# Patient Record
Sex: FEMALE | Race: WHITE | Employment: UNEMPLOYED | URBAN - NONMETROPOLITAN AREA
[De-identification: names, ages, dates, MRNs, and addresses within clinical notes are randomized per-mention and may not be internally consistent; named-entity substitution may affect disease eponyms.]

---

## 2017-03-22 LAB
CHOLESTEROL, TOTAL: 128 MG/DL
CHOLESTEROL/HDL RATIO: 2.1
HDLC SERPL-MCNC: 61 MG/DL (ref 35–70)
LDL CHOLESTEROL CALCULATED: 48.2 MG/DL (ref 0–160)
TRIGL SERPL-MCNC: 94 MG/DL
VLDLC SERPL CALC-MCNC: 19 MG/DL

## 2017-09-12 ENCOUNTER — OFFICE VISIT (OUTPATIENT)
Dept: FAMILY MEDICINE CLINIC | Age: 82
End: 2017-09-12
Payer: MEDICARE

## 2017-09-12 VITALS
TEMPERATURE: 98.3 F | DIASTOLIC BLOOD PRESSURE: 60 MMHG | HEART RATE: 80 BPM | WEIGHT: 156 LBS | SYSTOLIC BLOOD PRESSURE: 110 MMHG

## 2017-09-12 DIAGNOSIS — R05.9 COUGH: ICD-10-CM

## 2017-09-12 DIAGNOSIS — H66.002 ACUTE SUPPURATIVE OTITIS MEDIA OF LEFT EAR WITHOUT SPONTANEOUS RUPTURE OF TYMPANIC MEMBRANE, RECURRENCE NOT SPECIFIED: Primary | ICD-10-CM

## 2017-09-12 PROCEDURE — 1123F ACP DISCUSS/DSCN MKR DOCD: CPT | Performed by: FAMILY MEDICINE

## 2017-09-12 PROCEDURE — 99213 OFFICE O/P EST LOW 20 MIN: CPT | Performed by: FAMILY MEDICINE

## 2017-09-12 PROCEDURE — 4040F PNEUMOC VAC/ADMIN/RCVD: CPT | Performed by: FAMILY MEDICINE

## 2017-09-12 PROCEDURE — 1036F TOBACCO NON-USER: CPT | Performed by: FAMILY MEDICINE

## 2017-09-12 PROCEDURE — G8421 BMI NOT CALCULATED: HCPCS | Performed by: FAMILY MEDICINE

## 2017-09-12 PROCEDURE — 1090F PRES/ABSN URINE INCON ASSESS: CPT | Performed by: FAMILY MEDICINE

## 2017-09-12 PROCEDURE — G8427 DOCREV CUR MEDS BY ELIG CLIN: HCPCS | Performed by: FAMILY MEDICINE

## 2017-09-12 RX ORDER — FUROSEMIDE 40 MG/1
1 TABLET ORAL DAILY
COMMUNITY
Start: 2017-07-10 | End: 2017-10-02 | Stop reason: SDUPTHER

## 2017-09-12 RX ORDER — AZITHROMYCIN 250 MG/1
TABLET, FILM COATED ORAL
Qty: 1 PACKET | Refills: 0 | Status: SHIPPED | OUTPATIENT
Start: 2017-09-12 | End: 2017-09-22

## 2017-09-12 RX ORDER — CALCITRIOL 0.25 UG/1
1 CAPSULE, LIQUID FILLED ORAL
COMMUNITY
End: 2021-06-03

## 2017-09-12 RX ORDER — NITROGLYCERIN 0.4 MG/1
TABLET SUBLINGUAL
COMMUNITY

## 2017-09-12 RX ORDER — ATORVASTATIN CALCIUM 40 MG/1
1 TABLET, FILM COATED ORAL DAILY
COMMUNITY
Start: 2017-07-10 | End: 2017-10-25 | Stop reason: SDUPTHER

## 2017-09-12 RX ORDER — METOPROLOL SUCCINATE 50 MG/1
50 TABLET, EXTENDED RELEASE ORAL DAILY
COMMUNITY
Start: 2017-05-23 | End: 2017-10-25 | Stop reason: SDUPTHER

## 2017-09-12 RX ORDER — ISOSORBIDE MONONITRATE 30 MG/1
1 TABLET, EXTENDED RELEASE ORAL DAILY
COMMUNITY
Start: 2017-07-10 | End: 2019-10-07 | Stop reason: DRUGHIGH

## 2017-09-12 ASSESSMENT — ENCOUNTER SYMPTOMS
COUGH: 1
SHORTNESS OF BREATH: 0
WHEEZING: 0

## 2017-09-26 LAB
ALBUMIN: 3.6 G/DL (ref 3.5–5)
APPEARANCE: CLEAR
BASOPHILS ABSOLUTE: 0 10'3/UL (ref 0.1–0.2)
BASOPHILS RELATIVE PERCENT: 1.1 % (ref 0–1.7)
BILIRUBIN, URINE: NEGATIVE
BUN BLDV-MCNC: 21 MG/DL (ref 7–18)
CALCIUM SERPL-MCNC: 8.6 MG/DL (ref 8.5–10.1)
CHLORIDE BLD-SCNC: 106 MMOL/L (ref 97–107)
CO2: 27 MMOL/L (ref 21–32)
COLOR, URINE: YELLOW
CREAT SERPL-MCNC: 1.33 MG/DL (ref 0.55–1.02)
CREATININE URINE: 157 MG/DL
EOSINOPHILS ABSOLUTE: 0.1 10'3/UL (ref 0–0.4)
EOSINOPHILS RELATIVE PERCENT: 1.8 % (ref 0–6.4)
GFR CALCULATED: 40
GLUCOSE URINE: NEGATIVE G/DL
GLUCOSE: 123 MG/DL (ref 70–99)
HCT VFR BLD CALC: 41.9 % (ref 34.6–44.1)
HEMOGLOBIN: 14.1 G/DL (ref 11.7–14.9)
KETONES, URINE: NEGATIVE MG/DL
LEUKOCYTE ESTERASE, URINE: NEGATIVE
LYMPHOCYTES ABSOLUTE: 1 10'3/UL (ref 0.5–3.5)
LYMPHOCYTES RELATIVE PERCENT: 21.6 % (ref 17.4–45.9)
MAGNESIUM: 1.8 MG/DL (ref 1.8–2.4)
MCH RBC QN AUTO: 31.1 PG (ref 27.8–33.2)
MCHC RBC AUTO-ENTMCNC: 33.7 G/DL (ref 32.7–34.8)
MCV RBC AUTO: 92.1 FL (ref 83–97.4)
MICROALBUMIN UR-MCNC: 16.4 MG/L
MICROALBUMIN/CREAT UR-RTO: 11.4 MG/G
MONOCYTES ABSOLUTE: 0.4 10'3/UL (ref 0.2–0.8)
MONOCYTES RELATIVE PERCENT: 8.4 % (ref 4.4–12)
NEUTROPHILS ABSOLUTE: 3.1 10'3/UL (ref 1.5–5.6)
NEUTROPHILS SEGMENTED: 67.1 % (ref 41.2–72.1)
NITRITE, URINE: NEGATIVE
OCCULT BLOOD,URINE: NEGATIVE
PDW BLD-RTO: 12.9 % (ref 12.2–15.8)
PH, URINE: 6
PHOSPHORUS: 2.6 MG/DL (ref 2.5–4.9)
PLATELET # BLD: 227 10'3/UL (ref 122–359)
PMV BLD AUTO: 7.4 FL (ref 7.6–10.6)
POTASSIUM SERPL-SCNC: 3.9 MMOL/L (ref 3.5–5.1)
PROTEIN UA: NEGATIVE MG/DL
PTH INTACT (WITHOUT CALCIUM): 161 PG/ML (ref 14–64)
RBC # BLD: 4.55 10'6/UL (ref 3.85–4.88)
SODIUM BLD-SCNC: 140 MMOL/L (ref 136–145)
SPECIFIC GRAVITY, URINE: 1.02 (ref 1–1.03)
SQUAMOUS EPITHELIAL: NORMAL /LPF
UROBILINOGEN, URINE: 0.2 EU/DL
VITAMIN D 25-HYDROXY: 36 NG/ML (ref 30–100)
VITAMIN D2, 25 HYDROXY: <4 NG/ML
VITAMIN D3,25 HYDROXY: 36 NG/ML
WBC: 4.6 10'3/UL (ref 3.2–9.3)

## 2017-09-29 VITALS
SYSTOLIC BLOOD PRESSURE: 118 MMHG | WEIGHT: 164 LBS | BODY MASS INDEX: 30.18 KG/M2 | HEART RATE: 78 BPM | HEIGHT: 62 IN | DIASTOLIC BLOOD PRESSURE: 62 MMHG

## 2017-09-29 DIAGNOSIS — I25.10 ATHEROSCLEROSIS OF NATIVE CORONARY ARTERY OF NATIVE HEART WITHOUT ANGINA PECTORIS: ICD-10-CM

## 2017-09-29 DIAGNOSIS — N18.9 CHRONIC KIDNEY DISEASE, UNSPECIFIED: ICD-10-CM

## 2017-09-29 DIAGNOSIS — E21.3 HYPERPARATHYROIDISM, UNSPECIFIED (HCC): ICD-10-CM

## 2017-09-29 DIAGNOSIS — M81.0 AGE-RELATED OSTEOPOROSIS WITHOUT CURRENT PATHOLOGICAL FRACTURE: ICD-10-CM

## 2017-09-29 DIAGNOSIS — I47.20 VENTRICULAR TACHYCARDIA: ICD-10-CM

## 2017-09-29 DIAGNOSIS — I10 UNSPECIFIED ESSENTIAL HYPERTENSION: ICD-10-CM

## 2017-09-29 PROBLEM — C64.9 MALIGNANT NEOPLASM OF KIDNEY (HCC): Status: ACTIVE | Noted: 2017-09-29

## 2017-09-29 PROBLEM — E78.5 HYPERLIPIDEMIA: Status: ACTIVE | Noted: 2017-09-29

## 2017-09-29 RX ORDER — OMEPRAZOLE 20 MG/1
20 CAPSULE, DELAYED RELEASE ORAL DAILY
COMMUNITY
End: 2018-07-30 | Stop reason: CLARIF

## 2017-10-02 ENCOUNTER — OFFICE VISIT (OUTPATIENT)
Dept: FAMILY MEDICINE CLINIC | Age: 82
End: 2017-10-02
Payer: MEDICARE

## 2017-10-02 VITALS
WEIGHT: 155 LBS | SYSTOLIC BLOOD PRESSURE: 110 MMHG | DIASTOLIC BLOOD PRESSURE: 60 MMHG | HEART RATE: 68 BPM | BODY MASS INDEX: 27.46 KG/M2 | HEIGHT: 63 IN

## 2017-10-02 DIAGNOSIS — M81.0 AGE-RELATED OSTEOPOROSIS WITHOUT CURRENT PATHOLOGICAL FRACTURE: ICD-10-CM

## 2017-10-02 DIAGNOSIS — I25.10 ATHEROSCLEROSIS OF NATIVE CORONARY ARTERY OF NATIVE HEART WITHOUT ANGINA PECTORIS: Primary | ICD-10-CM

## 2017-10-02 DIAGNOSIS — C64.2 MALIGNANT NEOPLASM OF KIDNEY, LEFT (HCC): ICD-10-CM

## 2017-10-02 DIAGNOSIS — Z23 NEED FOR PROPHYLACTIC VACCINATION AND INOCULATION AGAINST INFLUENZA: ICD-10-CM

## 2017-10-02 DIAGNOSIS — N18.9 CHRONIC KIDNEY DISEASE, UNSPECIFIED: ICD-10-CM

## 2017-10-02 DIAGNOSIS — E21.3 HYPERPARATHYROIDISM, UNSPECIFIED (HCC): ICD-10-CM

## 2017-10-02 DIAGNOSIS — E78.2 MIXED HYPERLIPIDEMIA: ICD-10-CM

## 2017-10-02 PROCEDURE — G8419 CALC BMI OUT NRM PARAM NOF/U: HCPCS | Performed by: FAMILY MEDICINE

## 2017-10-02 PROCEDURE — G8427 DOCREV CUR MEDS BY ELIG CLIN: HCPCS | Performed by: FAMILY MEDICINE

## 2017-10-02 PROCEDURE — 99213 OFFICE O/P EST LOW 20 MIN: CPT | Performed by: FAMILY MEDICINE

## 2017-10-02 PROCEDURE — 1090F PRES/ABSN URINE INCON ASSESS: CPT | Performed by: FAMILY MEDICINE

## 2017-10-02 PROCEDURE — 4005F PHARM THX FOR OP RXD: CPT | Performed by: FAMILY MEDICINE

## 2017-10-02 PROCEDURE — G0008 ADMIN INFLUENZA VIRUS VAC: HCPCS | Performed by: FAMILY MEDICINE

## 2017-10-02 PROCEDURE — G8598 ASA/ANTIPLAT THER USED: HCPCS | Performed by: FAMILY MEDICINE

## 2017-10-02 PROCEDURE — 4040F PNEUMOC VAC/ADMIN/RCVD: CPT | Performed by: FAMILY MEDICINE

## 2017-10-02 PROCEDURE — 1036F TOBACCO NON-USER: CPT | Performed by: FAMILY MEDICINE

## 2017-10-02 PROCEDURE — 1123F ACP DISCUSS/DSCN MKR DOCD: CPT | Performed by: FAMILY MEDICINE

## 2017-10-02 PROCEDURE — G8484 FLU IMMUNIZE NO ADMIN: HCPCS | Performed by: FAMILY MEDICINE

## 2017-10-02 PROCEDURE — 90662 IIV NO PRSV INCREASED AG IM: CPT | Performed by: FAMILY MEDICINE

## 2017-10-02 RX ORDER — FUROSEMIDE 40 MG/1
40 TABLET ORAL DAILY
Qty: 90 TABLET | Refills: 3 | Status: SHIPPED | OUTPATIENT
Start: 2017-10-02 | End: 2018-09-04 | Stop reason: SDUPTHER

## 2017-10-02 ASSESSMENT — PATIENT HEALTH QUESTIONNAIRE - PHQ9
SUM OF ALL RESPONSES TO PHQ9 QUESTIONS 1 & 2: 0
1. LITTLE INTEREST OR PLEASURE IN DOING THINGS: 0
2. FEELING DOWN, DEPRESSED OR HOPELESS: 0
SUM OF ALL RESPONSES TO PHQ QUESTIONS 1-9: 0

## 2017-10-02 ASSESSMENT — ENCOUNTER SYMPTOMS
ABDOMINAL PAIN: 0
BLOOD IN STOOL: 0
SHORTNESS OF BREATH: 0
DIARRHEA: 0
WHEEZING: 0
CONSTIPATION: 0
SORE THROAT: 0

## 2017-10-02 NOTE — MR AVS SNAPSHOT
cancers. BMI is not perfect. It may overestimate body fat in athletes and people who are more muscular. If your body fat is high you can improve your BMI by decreasing your calorie intake and becoming more physically active. Learn more at: [employer]             Today's Medication Changes          These changes are accurate as of: 10/2/17 10:19 AM.  If you have any questions, ask your nurse or doctor. CHANGE how you take these medications           denosumab 60 MG/ML Soln SC injection   Commonly known as:  PROLIA   Instructions:  Infuse 1 mL intravenously every 6 months   Quantity:  1.8 mL   Refills:  0   What changed:    - how much to take  - how to take this   Changed by:  Charly Guillermo MD       furosemide 40 MG tablet   Commonly known as:  LASIX   Instructions:   Take 1 tablet by mouth daily   Quantity:  90 tablet   Refills:  3   What changed:  how to take this   Changed by:  Charly Guillermo MD            Where to Get Your Medications      These medications were sent to [address] [place] [address]     Phone:  [phone]     furosemide 40 MG tablet         You can get these medications from any pharmacy     Bring a paper prescription for each of these medications     denosumab 60 MG/ML Soln SC injection               Your Current Medications Are              denosumab (PROLIA) 60 MG/ML SOLN SC injection Infuse 1 mL intravenously every 6 months    furosemide (LASIX) 40 MG tablet Take 1 tablet by mouth daily    omeprazole (PRILOSEC) 20 MG delayed release capsule Take 20 mg by mouth daily    vitamin D (CHOLECALCIFEROL) 1000 UNIT TABS tablet Take 1,000 Units by mouth daily    aspirin 81 MG tablet Take 1 tablet by mouth daily    atorvastatin (LIPITOR) 40 MG tablet Take 1 tablet by mouth daily MyChart Signup           [employer] allows you to send messages to your doctor, view your test results, renew your prescriptions, schedule appointments, view visit notes, and more. How Do I Sign Up? 1. In your Internet browser, go to [email or web address]  2. Click on the Sign Up Now link in the Sign In box. You will see the New Member Sign Up page. 3. Enter your [employer] Access Code exactly as it appears below. You will not need to use this code after youve completed the sign-up process. If you do not sign up before the expiration date, you must request a new code. [employer] Access Code: [email or web address]  Expires: 11/11/2017  9:06 AM    4. Enter your Social Security Number ([id number]) and Date of Birth (mm/dd/yyyy) as indicated and click Submit. You will be taken to the next sign-up page. 5. Create a [employer] ID. This will be your [employer] login ID and cannot be changed, so think of one that is secure and easy to remember. 6. Create a [employer] password. You can change your password at any time. 7. Enter your Password Reset Question and Answer. This can be used at a later time if you forget your password. 8. Enter your e-mail address. You will receive e-mail notification when new information is available in [address] 9. Click Sign Up. You can now view your medical record. Additional Information  If you have questions, please contact the physician practice where you receive care. Remember, [employer] is NOT to be used for urgent needs. For medical emergencies, dial 911. For questions regarding your [employer] account call [phone]. If you have a clinical question, please call your doctor's office.

## 2017-10-02 NOTE — PROGRESS NOTES
rhonchi. She has no rales. She exhibits no tenderness. Abdominal: Soft. There is no hepatosplenomegaly. There is no tenderness. Musculoskeletal: She exhibits no edema. Lymphadenopathy:     She has no cervical adenopathy. She has no axillary adenopathy. Neurological: No cranial nerve deficit. Vitals reviewed. Assessment:     1. Atherosclerosis of native coronary artery of native heart without angina pectoris     2. Need for prophylactic vaccination and inoculation against influenza  INFLUENZA, HIGH DOSE, 65 YRS +, IM, PF, PREFILL SYR, 0.5ML (FLUZONE HD)   3. Age-related osteoporosis without current pathological fracture  denosumab (PROLIA) 60 MG/ML SOLN SC injection   4. Chronic kidney disease, unspecified  furosemide (LASIX) 40 MG tablet   5. Mixed hyperlipidemia     6. Hyperparathyroidism, unspecified ([facility] [address]     7. Malignant neoplasm of kidney, left [facility])      s/p nephrectomy 1996             Plan:     Continue current meds including prolia   Orders Given:  Orders Placed This Encounter   Procedures    INFLUENZA, HIGH DOSE, 65 YRS +, IM, PF, PREFILL SYR, 0.5ML (FLUZONE HD)     Prescriptions:    Orders Placed This Encounter   Medications    denosumab (PROLIA) 60 MG/ML SOLN SC injection     Sig: Infuse 1 mL intravenously every 6 months     Dispense:  1.8 mL     Refill:  0    furosemide (LASIX) 40 MG tablet     Sig: Take 1 tablet by mouth daily     Dispense:  90 tablet     Refill:  3        Return in about 6 months (around 4/2/2018). Electronically signed by Zach Sanchez MD on 10/2/2017.

## 2017-10-21 ENCOUNTER — TELEPHONE (OUTPATIENT)
Dept: FAMILY MEDICINE CLINIC | Age: 82
End: 2017-10-21

## 2017-10-21 DIAGNOSIS — M81.0 AGE RELATED OSTEOPOROSIS, UNSPECIFIED PATHOLOGICAL FRACTURE PRESENCE: Primary | ICD-10-CM

## 2017-10-25 DIAGNOSIS — E78.2 MIXED HYPERLIPIDEMIA: Primary | ICD-10-CM

## 2017-10-25 DIAGNOSIS — I10 ESSENTIAL HYPERTENSION: ICD-10-CM

## 2017-10-25 LAB — CALCIUM SERPL-MCNC: 9.6 MG/DL

## 2017-10-26 RX ORDER — ATORVASTATIN CALCIUM 40 MG/1
40 TABLET, FILM COATED ORAL DAILY
Qty: 90 TABLET | Refills: 3 | Status: SHIPPED | OUTPATIENT
Start: 2017-10-26 | End: 2018-09-04 | Stop reason: SDUPTHER

## 2017-10-26 RX ORDER — METOPROLOL SUCCINATE 50 MG/1
50 TABLET, EXTENDED RELEASE ORAL DAILY
Qty: 90 TABLET | Refills: 3 | Status: SHIPPED | OUTPATIENT
Start: 2017-10-26 | End: 2019-04-03

## 2018-03-28 LAB
ALBUMIN: 3.5 G/DL (ref 3.5–5)
APPEARANCE: CLEAR
BASOPHILS ABSOLUTE: 0 10'3/UL (ref 0.1–0.2)
BASOPHILS RELATIVE PERCENT: 0.3 % (ref 0–1.7)
BILIRUBIN, URINE: NEGATIVE
BUN BLDV-MCNC: 20 MG/DL (ref 7–18)
CALCIUM SERPL-MCNC: 8.6 MG/DL (ref 8.5–10.1)
CHLORIDE BLD-SCNC: 110 MMOL/L (ref 97–107)
CO2: 26 MMOL/L (ref 21–32)
COLOR, URINE: YELLOW
CREAT SERPL-MCNC: 1.24 MG/DL (ref 0.55–1.02)
CREATININE URINE: 176 MG/DL
EOSINOPHILS ABSOLUTE: 0.1 10'3/UL (ref 0–0.4)
EOSINOPHILS RELATIVE PERCENT: 2 % (ref 0–6.4)
GFR CALCULATED: 43
GLUCOSE URINE: NEGATIVE G/DL
GLUCOSE: 88 MG/DL (ref 70–99)
HCT VFR BLD CALC: 42.7 % (ref 34.6–44.1)
HEMOGLOBIN: 14.3 G/DL (ref 11.7–14.9)
KETONES, URINE: ABNORMAL MG/DL
LEUKOCYTE ESTERASE, URINE: NEGATIVE
LYMPHOCYTES ABSOLUTE: 0.8 10'3/UL (ref 0.5–3.5)
LYMPHOCYTES RELATIVE PERCENT: 15.2 % (ref 17.4–45.9)
MAGNESIUM: 1.8 MG/DL (ref 1.8–2.4)
MCH RBC QN AUTO: 31.6 PG (ref 27.8–33.2)
MCHC RBC AUTO-ENTMCNC: 33.5 G/DL (ref 32.7–34.8)
MCV RBC AUTO: 94.5 FL (ref 83–97.4)
MICROALBUMIN UR-MCNC: 45.3 MG/L
MICROALBUMIN/CREAT UR-RTO: 28.3 MG/G
MONOCYTES ABSOLUTE: 0.5 10'3/UL (ref 0.2–0.8)
MONOCYTES RELATIVE PERCENT: 9.2 % (ref 4.4–12)
MUCUS, URINE: ABNORMAL /LPF
NEUTROPHILS ABSOLUTE: 3.7 10'3/UL (ref 1.5–5.6)
NEUTROPHILS SEGMENTED: 73.3 % (ref 41.2–72.1)
NITRITE, URINE: NEGATIVE
OCCULT BLOOD,URINE: NEGATIVE
PDW BLD-RTO: 13.5 % (ref 12.2–15.8)
PH, URINE: 6
PHOSPHORUS: 3.2 MG/DL (ref 2.5–4.9)
PLATELET # BLD: 182 10'3/UL (ref 122–359)
PMV BLD AUTO: 7.3 FL (ref 7.6–10.6)
POTASSIUM SERPL-SCNC: 3.7 MMOL/L (ref 3.5–5.1)
PROTEIN UA: NEGATIVE MG/DL
PTH INTACT (WITHOUT CALCIUM): 63 PG/ML (ref 14–64)
RBC # BLD: 4.52 10'6/UL (ref 3.85–4.88)
RBC URINE: ABNORMAL /HPF (ref 0–5)
SODIUM BLD-SCNC: 144 MMOL/L (ref 136–145)
SPECIFIC GRAVITY, URINE: 1.02 (ref 1–1.03)
SQUAMOUS EPITHELIAL: ABNORMAL /LPF
URINE SEDIMENT COMMENTS: ABNORMAL /HPF
UROBILINOGEN, URINE: 0.2 EU/DL
VITAMIN D 25-HYDROXY: 40 NG/ML (ref 30–100)
VITAMIN D2, 25 HYDROXY: <4 NG/ML
VITAMIN D3,25 HYDROXY: 40 NG/ML
WBC URINE: ABNORMAL /HPF (ref 0–4)
WBC: 5.1 10'3/UL (ref 3.2–9.3)

## 2018-03-30 LAB
CREAT SERPL-MCNC: 1.35 MG/DL (ref 0.55–1.02)
CREATININE URINE: 63.4 MG/DL

## 2018-04-02 ENCOUNTER — OFFICE VISIT (OUTPATIENT)
Dept: FAMILY MEDICINE CLINIC | Age: 83
End: 2018-04-02
Payer: MEDICARE

## 2018-04-02 VITALS
BODY MASS INDEX: 27.01 KG/M2 | HEART RATE: 60 BPM | WEIGHT: 152 LBS | SYSTOLIC BLOOD PRESSURE: 128 MMHG | DIASTOLIC BLOOD PRESSURE: 68 MMHG

## 2018-04-02 DIAGNOSIS — C64.2 MALIGNANT NEOPLASM OF LEFT KIDNEY (HCC): ICD-10-CM

## 2018-04-02 DIAGNOSIS — E78.2 MIXED HYPERLIPIDEMIA: ICD-10-CM

## 2018-04-02 DIAGNOSIS — N18.9 CHRONIC KIDNEY DISEASE, UNSPECIFIED CKD STAGE: ICD-10-CM

## 2018-04-02 DIAGNOSIS — M81.0 AGE-RELATED OSTEOPOROSIS WITHOUT CURRENT PATHOLOGICAL FRACTURE: ICD-10-CM

## 2018-04-02 DIAGNOSIS — Z23 NEED FOR 23-POLYVALENT PNEUMOCOCCAL POLYSACCHARIDE VACCINE: ICD-10-CM

## 2018-04-02 DIAGNOSIS — E21.3 HYPERPARATHYROIDISM (HCC): ICD-10-CM

## 2018-04-02 DIAGNOSIS — I10 ESSENTIAL HYPERTENSION: Primary | ICD-10-CM

## 2018-04-02 DIAGNOSIS — I25.10 ATHEROSCLEROSIS OF NATIVE CORONARY ARTERY OF NATIVE HEART WITHOUT ANGINA PECTORIS: ICD-10-CM

## 2018-04-02 PROBLEM — I47.20 VENTRICULAR TACHYCARDIA: Status: RESOLVED | Noted: 2017-09-29 | Resolved: 2018-04-02

## 2018-04-02 PROBLEM — H66.002 ACUTE SUPPURATIVE OTITIS MEDIA OF LEFT EAR WITHOUT SPONTANEOUS RUPTURE OF TYMPANIC MEMBRANE: Status: RESOLVED | Noted: 2017-09-12 | Resolved: 2018-04-02

## 2018-04-02 LAB
ALT SERPL-CCNC: 36 UNITS/L
AST SERPL-CCNC: 23 UNITS/L
CHOLESTEROL/HDL RATIO: 1.9 RATIO
CHOLESTEROL: 119 MG/DL
HDL, DIRECT: 62 MG/DL
LDL CHOLESTEROL CALCULATED: 37.8 MG/DL
TRIGL SERPL-MCNC: 96 MG/DL
VLDLC SERPL CALC-MCNC: 19 MG/DL

## 2018-04-02 PROCEDURE — 1036F TOBACCO NON-USER: CPT | Performed by: FAMILY MEDICINE

## 2018-04-02 PROCEDURE — 1123F ACP DISCUSS/DSCN MKR DOCD: CPT | Performed by: FAMILY MEDICINE

## 2018-04-02 PROCEDURE — 4040F PNEUMOC VAC/ADMIN/RCVD: CPT | Performed by: FAMILY MEDICINE

## 2018-04-02 PROCEDURE — G8419 CALC BMI OUT NRM PARAM NOF/U: HCPCS | Performed by: FAMILY MEDICINE

## 2018-04-02 PROCEDURE — G8427 DOCREV CUR MEDS BY ELIG CLIN: HCPCS | Performed by: FAMILY MEDICINE

## 2018-04-02 PROCEDURE — G8598 ASA/ANTIPLAT THER USED: HCPCS | Performed by: FAMILY MEDICINE

## 2018-04-02 PROCEDURE — 99214 OFFICE O/P EST MOD 30 MIN: CPT | Performed by: FAMILY MEDICINE

## 2018-04-02 PROCEDURE — 1090F PRES/ABSN URINE INCON ASSESS: CPT | Performed by: FAMILY MEDICINE

## 2018-04-02 ASSESSMENT — ENCOUNTER SYMPTOMS
WHEEZING: 0
ABDOMINAL PAIN: 0
CONSTIPATION: 0
DIARRHEA: 0
BLOOD IN STOOL: 0
SHORTNESS OF BREATH: 0
SORE THROAT: 0

## 2018-07-30 ENCOUNTER — OFFICE VISIT (OUTPATIENT)
Dept: FAMILY MEDICINE CLINIC | Age: 83
End: 2018-07-30
Payer: MEDICARE

## 2018-07-30 VITALS
BODY MASS INDEX: 25.77 KG/M2 | SYSTOLIC BLOOD PRESSURE: 130 MMHG | WEIGHT: 145 LBS | HEART RATE: 68 BPM | DIASTOLIC BLOOD PRESSURE: 80 MMHG

## 2018-07-30 DIAGNOSIS — M25.551 ACUTE RIGHT HIP PAIN: Primary | ICD-10-CM

## 2018-07-30 PROCEDURE — 1101F PT FALLS ASSESS-DOCD LE1/YR: CPT | Performed by: FAMILY MEDICINE

## 2018-07-30 PROCEDURE — 4040F PNEUMOC VAC/ADMIN/RCVD: CPT | Performed by: FAMILY MEDICINE

## 2018-07-30 PROCEDURE — G8419 CALC BMI OUT NRM PARAM NOF/U: HCPCS | Performed by: FAMILY MEDICINE

## 2018-07-30 PROCEDURE — 99213 OFFICE O/P EST LOW 20 MIN: CPT | Performed by: FAMILY MEDICINE

## 2018-07-30 PROCEDURE — 1036F TOBACCO NON-USER: CPT | Performed by: FAMILY MEDICINE

## 2018-07-30 PROCEDURE — G8427 DOCREV CUR MEDS BY ELIG CLIN: HCPCS | Performed by: FAMILY MEDICINE

## 2018-07-30 PROCEDURE — G8598 ASA/ANTIPLAT THER USED: HCPCS | Performed by: FAMILY MEDICINE

## 2018-07-30 PROCEDURE — 1090F PRES/ABSN URINE INCON ASSESS: CPT | Performed by: FAMILY MEDICINE

## 2018-07-30 PROCEDURE — 1123F ACP DISCUSS/DSCN MKR DOCD: CPT | Performed by: FAMILY MEDICINE

## 2018-07-30 RX ORDER — METHYLPREDNISOLONE 4 MG/1
TABLET ORAL
Qty: 1 KIT | Refills: 0 | Status: SHIPPED | OUTPATIENT
Start: 2018-07-30 | End: 2018-08-05

## 2018-07-30 ASSESSMENT — ENCOUNTER SYMPTOMS
ABDOMINAL PAIN: 0
BACK PAIN: 0

## 2018-07-30 NOTE — PROGRESS NOTES
[address] [place] [address] E. [address]  Dept: [phone]  Dept Fax: [phone]    Marielena Fontaine is a 80 y.o. female who presents today for her medical conditions/complaints as noted below. Marielena Fontaine is c/o of Hip Pain (pt c/o right hip pain started last week states it hurts to put any weight on right side has been getting worse, hurts to sit,stand or walk pain is most relieved when laying flat has been taking Tylenol which helps relieve some of the pain)      HPI:     HPI   Patient comes in with right hip pain. This started last Wednesday. gradual onset on Wednesday. It was no trauma. No fall. No rash. Pain is localized around the right hip joint with radiation into the groin. It hurts if she puts weight on it. So it hurts if she walks. It actually hurts if she sits. She is comfortable she lays flat. This is about the worst pain she's ever had. Does have a history of osteoporosis without pathologic fracture. She remains on prolia   Last bone density testing April 2017. T score -2.9, left femoral neckShe knows    She denies problems with her bowel or bladder. No blood per rectum. No burning with urination.         BP Readings from Last 3 Encounters:   07/30/18 130/80   04/02/18 128/68   10/02/17 110/60            (goal 120/80)    Past Medical History:   Diagnosis Date    Acute renal failure (HCC)     Adrenal mass (HCC)     stable    Aseptic necrosis (HCC)     right hip    CAD (coronary artery disease)     COPD (chronic obstructive pulmonary disease) (HCC)     good bronchodilator response  and mild reduction in DLCO    Left ventricular dysfunction     Osteoporosis     Renal cell carcinoma ([facility] [address] 1996    Small bowel obstruction 2006    ischemic bowel      Past Surgical History:   Procedure Laterality Date    AV FISTULA REPAIR  2007    enterocutaneous    CHOLECYSTECTOMY  2007    CORON[facility]Y  02/2015    [facility] pain    Codeine      Other reaction(s): Intolerance-unknown  Other reaction(s): Intolerance-unknown  Other reaction(s): Intolerance-unknown    Fentanyl      Other reaction(s): Intolerance-unknown  Other reaction(s): Intolerance-unknown  Other reaction(s): Intolerance-unknown    Hydrocodone-Acetaminophen      Other reaction(s): Intolerance-unknown  Other reaction(s): Intolerance-unknown  Other reaction(s): Intolerance-unknown    Iodides      Other reaction(s): Intolerance-unknown  Other reaction(s): Intolerance-unknown  Other reaction(s): Intolerance-unknown    Lisinopril      Other reaction(s): Intolerance-unknown  Other reaction(s): Intolerance-unknown  Other reaction(s): Intolerance-unknown  Other reaction(s): Intolerance-unknown  Other reaction(s): Intolerance-unknown  Other reaction(s): Intolerance-unknown       Health Maintenance   Topic Date Due    Shingles Vaccine (1 of 2 - 2 Dose Series) 10/07/1976    DTaP/Tdap/Td vaccine (1 - Tdap) 09/05/1998    Flu vaccine (1) 09/01/2018    Potassium monitoring  03/28/2019    Creatinine monitoring  03/30/2019    Pneumococcal low/med risk  Completed       Subjective:      Review of Systems   Constitutional: Negative for appetite change, chills and fever. Cardiovascular: Negative for leg swelling. Gastrointestinal: Negative for abdominal pain. Musculoskeletal: Positive for arthralgias and gait problem. Negative for back pain. Hematological: Negative for adenopathy. Objective:     /80   Pulse 68   Wt 145 lb (65.8 kg)   BMI 25.77 kg/m²     Physical Exam   Constitutional: She appears well-nourished. Neck: Neck supple. Cardiovascular: Normal rate, regular rhythm, S1 normal and S2 normal.    No murmur heard. Pulmonary/Chest: Breath sounds normal. She has no wheezes. She has no rhonchi. She has no rales. She exhibits no tenderness. Abdominal: Soft. There is no hepatosplenomegaly. There is no tenderness.  There is no rebound, no guarding and

## 2018-08-02 ENCOUNTER — OFFICE VISIT (OUTPATIENT)
Dept: FAMILY MEDICINE CLINIC | Age: 83
End: 2018-08-02
Payer: MEDICARE

## 2018-08-02 VITALS
HEART RATE: 64 BPM | DIASTOLIC BLOOD PRESSURE: 74 MMHG | BODY MASS INDEX: 25.95 KG/M2 | SYSTOLIC BLOOD PRESSURE: 110 MMHG | WEIGHT: 146 LBS

## 2018-08-02 DIAGNOSIS — M25.551 ACUTE RIGHT HIP PAIN: Primary | ICD-10-CM

## 2018-08-02 PROCEDURE — 1123F ACP DISCUSS/DSCN MKR DOCD: CPT | Performed by: FAMILY MEDICINE

## 2018-08-02 PROCEDURE — 4040F PNEUMOC VAC/ADMIN/RCVD: CPT | Performed by: FAMILY MEDICINE

## 2018-08-02 PROCEDURE — G8419 CALC BMI OUT NRM PARAM NOF/U: HCPCS | Performed by: FAMILY MEDICINE

## 2018-08-02 PROCEDURE — G8598 ASA/ANTIPLAT THER USED: HCPCS | Performed by: FAMILY MEDICINE

## 2018-08-02 PROCEDURE — 1036F TOBACCO NON-USER: CPT | Performed by: FAMILY MEDICINE

## 2018-08-02 PROCEDURE — 1101F PT FALLS ASSESS-DOCD LE1/YR: CPT | Performed by: FAMILY MEDICINE

## 2018-08-02 PROCEDURE — G8427 DOCREV CUR MEDS BY ELIG CLIN: HCPCS | Performed by: FAMILY MEDICINE

## 2018-08-02 PROCEDURE — 1090F PRES/ABSN URINE INCON ASSESS: CPT | Performed by: FAMILY MEDICINE

## 2018-08-02 PROCEDURE — 99213 OFFICE O/P EST LOW 20 MIN: CPT | Performed by: FAMILY MEDICINE

## 2018-08-02 RX ORDER — MULTIVIT-MIN/IRON/FOLIC ACID/K 18-600-40
1 CAPSULE ORAL DAILY
COMMUNITY

## 2018-08-02 ASSESSMENT — ENCOUNTER SYMPTOMS
ABDOMINAL PAIN: 0
BACK PAIN: 0

## 2018-08-02 NOTE — PROGRESS NOTES
[address] [address] [address]  Dept: [phone]  Dept Fax: [phone]    Mae Murphy is a 80 y.o. female who presents today for her medical conditions/complaints as noted below. Mae Murphy is c/o of Follow-up (pt here to f/u on hip pain and to review imaging. pt reports she )      HPI:     HPI     Patient follows up with her hip and back pain. Fortunately she feels much better. He says this and she started a Medrol Dosepak next day she felt better. Her x-rays are basically unremarkable. Some arthritic changes of the SI joints. Right hip arthroplasty was stable. No evidence of fracture or loosening.         BP Readings from Last 3 Encounters:   08/02/18 110/74   07/30/18 130/80   04/02/18 128/68            (goal 120/80)    Past Medical History:   Diagnosis Date    Acute renal failure (HCC)     Adrenal mass (HCC)     stable    Aseptic necrosis (HCC)     right hip    CAD (coronary artery disease)     COPD (chronic obstructive pulmonary disease) (HCC)     good bronchodilator response  and mild reduction in DLCO    Left ventricular dysfunction     Osteoporosis     Renal cell carcinoma ([address] 1996    Small bowel obstruction 2006    ischemic bowel      Past Surgical History:   Procedure Laterality Date    AV FISTULA REPAIR  2007    enterocutaneous    CHOLECYSTECTOMY  2007    [facility]2015    [facility]    CORONARY ANGIOPLASTY WITH STENT PLACEMENT  1998    RCA twice in [address] (once for IWMI; repeat for re stenosis)   Adrian Rangel DILATION  [id number]    FEMUR FRACTURE SURGERY Right 2002   [place] Goljarred HERNIA REPAIR  2007    ventral hernia-Dr Sandoval    HERNIA REPAIR  05/2009    complex ventral Dr Afsahn Zamora Right 2006    SMALL INTESTINE SURGERY  2006    ischemic bowel    TOTAL NEPHRECTOMY  1996    UPPER GASTROINTESTINAL ENDOSCOPY  06/2007    Dr Nika Kumari     Family History Problem Relation Age of Onset    Breast Cancer Mother     Diabetes Mother     Coronary Art Dis Father      Social History   Substance Use Topics    Smoking status: Former Smoker    Smokeless tobacco: Never Used    Alcohol use No        Current Outpatient Prescriptions   Medication Sig Dispense Refill    Cholecalciferol (VITAMIN D) 2000 units CAPS capsule Take 1 capsule by mouth daily      methylPREDNISolone (MEDROL DOSEPACK) 4 MG tablet Take by mouth. 1 kit 0    denosumab (PROLIA) 60 MG/ML SOLN SC injection Infuse 1 mL intravenously every 6 months 1.8 mL 0    atorvastatin (LIPITOR) 40 MG tablet Take 1 tablet by mouth daily 90 tablet 3    metoprolol succinate (TOPROL XL) 50 MG extended release tablet Take 1 tablet by mouth daily 1/2 tablet 90 tablet 3    furosemide (LASIX) 40 MG tablet Take 1 tablet by mouth daily 90 tablet 3    aspirin 81 MG tablet Take 1 tablet by mouth daily      calcitRIOL (ROCALTROL) 0.25 MCG capsule Take 1 capsule by mouth three times a week      isosorbide mononitrate (IMDUR) 30 MG extended release tablet Take 1 tablet by mouth daily      nitroGLYCERIN (NITROSTAT) 0.4 MG SL tablet       Omeprazole Magnesium (PRILOSEC OTC PO) 1 tablet as needed       No current facility-administered medications for this visit. Allergies   Allergen Reactions    Alendronate      Back pain    Codeine      Other reaction(s): Intolerance-unknown  Other reaction(s): Intolerance-unknown  Other reaction(s): Intolerance-unknown    Fentanyl      Other reaction(s): Intolerance-unknown  Other reaction(s): Intolerance-unknown  Other reaction(s): Intolerance-unknown    Hydrocodone-Acetaminophen      Other reaction(s): Intolerance-unknown  Other reaction(s): Intolerance-unknown  Other reaction(s): Intolerance-unknown    Iodides      Other reaction(s): Intolerance-unknown  Other reaction(s): Intolerance-unknown  Other reaction(s):  Intolerance-unknown    Lisinopril      Other reaction(s):

## 2018-09-04 DIAGNOSIS — E78.2 MIXED HYPERLIPIDEMIA: ICD-10-CM

## 2018-09-04 DIAGNOSIS — N18.9 CHRONIC KIDNEY DISEASE, UNSPECIFIED: ICD-10-CM

## 2018-09-04 RX ORDER — ATORVASTATIN CALCIUM 40 MG/1
40 TABLET, FILM COATED ORAL DAILY
Qty: 90 TABLET | Refills: 3 | Status: SHIPPED | OUTPATIENT
Start: 2018-09-04 | End: 2018-10-02 | Stop reason: DRUGHIGH

## 2018-09-04 RX ORDER — FUROSEMIDE 40 MG/1
40 TABLET ORAL DAILY
Qty: 90 TABLET | Refills: 3 | Status: SHIPPED | OUTPATIENT
Start: 2018-09-04 | End: 2018-10-02 | Stop reason: SDUPTHER

## 2018-10-02 ENCOUNTER — OFFICE VISIT (OUTPATIENT)
Dept: FAMILY MEDICINE CLINIC | Age: 83
End: 2018-10-02
Payer: MEDICARE

## 2018-10-02 VITALS
SYSTOLIC BLOOD PRESSURE: 124 MMHG | BODY MASS INDEX: 25.91 KG/M2 | HEART RATE: 70 BPM | OXYGEN SATURATION: 97 % | DIASTOLIC BLOOD PRESSURE: 78 MMHG | WEIGHT: 145.8 LBS

## 2018-10-02 DIAGNOSIS — E21.3 HYPERPARATHYROIDISM (HCC): ICD-10-CM

## 2018-10-02 DIAGNOSIS — I25.10 ATHEROSCLEROSIS OF NATIVE CORONARY ARTERY OF NATIVE HEART WITHOUT ANGINA PECTORIS: Primary | ICD-10-CM

## 2018-10-02 DIAGNOSIS — Z23 NEED FOR PROPHYLACTIC VACCINATION AND INOCULATION AGAINST INFLUENZA: ICD-10-CM

## 2018-10-02 DIAGNOSIS — C64.2 MALIGNANT NEOPLASM OF LEFT KIDNEY (HCC): ICD-10-CM

## 2018-10-02 DIAGNOSIS — E78.2 MIXED HYPERLIPIDEMIA: ICD-10-CM

## 2018-10-02 DIAGNOSIS — N18.9 CHRONIC KIDNEY DISEASE, UNSPECIFIED CKD STAGE: ICD-10-CM

## 2018-10-02 DIAGNOSIS — M81.0 AGE-RELATED OSTEOPOROSIS WITHOUT CURRENT PATHOLOGICAL FRACTURE: ICD-10-CM

## 2018-10-02 DIAGNOSIS — I10 ESSENTIAL HYPERTENSION: ICD-10-CM

## 2018-10-02 LAB — CALCIUM SERPL-MCNC: 9.5 MG/DL

## 2018-10-02 PROCEDURE — G8419 CALC BMI OUT NRM PARAM NOF/U: HCPCS | Performed by: FAMILY MEDICINE

## 2018-10-02 PROCEDURE — 1123F ACP DISCUSS/DSCN MKR DOCD: CPT | Performed by: FAMILY MEDICINE

## 2018-10-02 PROCEDURE — 1036F TOBACCO NON-USER: CPT | Performed by: FAMILY MEDICINE

## 2018-10-02 PROCEDURE — G8482 FLU IMMUNIZE ORDER/ADMIN: HCPCS | Performed by: FAMILY MEDICINE

## 2018-10-02 PROCEDURE — 99214 OFFICE O/P EST MOD 30 MIN: CPT | Performed by: FAMILY MEDICINE

## 2018-10-02 PROCEDURE — 4040F PNEUMOC VAC/ADMIN/RCVD: CPT | Performed by: FAMILY MEDICINE

## 2018-10-02 PROCEDURE — G8427 DOCREV CUR MEDS BY ELIG CLIN: HCPCS | Performed by: FAMILY MEDICINE

## 2018-10-02 PROCEDURE — 1090F PRES/ABSN URINE INCON ASSESS: CPT | Performed by: FAMILY MEDICINE

## 2018-10-02 PROCEDURE — G0008 ADMIN INFLUENZA VIRUS VAC: HCPCS | Performed by: FAMILY MEDICINE

## 2018-10-02 PROCEDURE — 1101F PT FALLS ASSESS-DOCD LE1/YR: CPT | Performed by: FAMILY MEDICINE

## 2018-10-02 PROCEDURE — G8598 ASA/ANTIPLAT THER USED: HCPCS | Performed by: FAMILY MEDICINE

## 2018-10-02 PROCEDURE — 90662 IIV NO PRSV INCREASED AG IM: CPT | Performed by: FAMILY MEDICINE

## 2018-10-02 RX ORDER — FUROSEMIDE 40 MG/1
40 TABLET ORAL DAILY
Qty: 90 TABLET | Refills: 3 | Status: SHIPPED | OUTPATIENT
Start: 2018-10-02 | End: 2019-04-03 | Stop reason: SDUPTHER

## 2018-10-02 RX ORDER — ATORVASTATIN CALCIUM 40 MG/1
40 TABLET, FILM COATED ORAL DAILY
Qty: 90 TABLET | Refills: 3 | Status: SHIPPED
Start: 2018-10-02 | End: 2019-04-03 | Stop reason: DRUGHIGH

## 2018-10-02 RX ORDER — FUROSEMIDE 40 MG/1
40 TABLET ORAL DAILY
Qty: 90 TABLET | Refills: 3 | Status: SHIPPED | OUTPATIENT
Start: 2018-10-02 | End: 2018-10-02 | Stop reason: SDUPTHER

## 2018-10-02 ASSESSMENT — PATIENT HEALTH QUESTIONNAIRE - PHQ9
1. LITTLE INTEREST OR PLEASURE IN DOING THINGS: 0
SUM OF ALL RESPONSES TO PHQ QUESTIONS 1-9: 0
SUM OF ALL RESPONSES TO PHQ9 QUESTIONS 1 & 2: 0
2. FEELING DOWN, DEPRESSED OR HOPELESS: 0
SUM OF ALL RESPONSES TO PHQ QUESTIONS 1-9: 0

## 2018-10-03 NOTE — PROGRESS NOTES
Pneumococcal low/med risk  Completed       Lab Results   Component Value Date    K 3.7 03/28/2018    CREATININE 1.35 03/30/2018    AST 23 04/02/2018    ALT 36 04/02/2018    HCT 42.7 03/28/2018    GLUCOSE 88 03/28/2018    MG 1.8 03/28/2018    CALCIUM 9.5 10/02/2018    VITD25 40 03/28/2018      Lab Results   Component Value Date    CHOL 119 04/02/2018    CHOL 128 03/22/2017    TRIG 96 04/02/2018    HDL 61 03/22/2017       Review of Systems:      Review of Systems   Constitutional: Negative for appetite change, chills and fever. HENT: Negative for sore throat. Respiratory: Negative for shortness of breath and wheezing. Cardiovascular: Negative for chest pain, palpitations and leg swelling. Gastrointestinal: Negative for abdominal pain, blood in stool, constipation and diarrhea. Genitourinary: Negative for dysuria, hematuria and urgency. Musculoskeletal: Negative for arthralgias. Hematological: Negative for adenopathy. Objective:     /78 (Site: Left Upper Arm, Position: Sitting, Cuff Size: Small Adult)   Pulse 70   Wt 145 lb 12.8 oz (66.1 kg)   SpO2 97%   BMI 25.91 kg/m²     Physical Exam   Constitutional: She appears well-developed and well-nourished. Appears years younger than her stated age   HENT:   Head: Normocephalic. Right Ear: Tympanic membrane normal.   Left Ear: Tympanic membrane normal.   Nose: Nose normal.   Mouth/Throat: No oropharyngeal exudate or posterior oropharyngeal erythema. Neck: Neck supple. Carotid bruit is not present. No thyromegaly present. Cardiovascular: Normal rate, regular rhythm, S1 normal and S2 normal.    No murmur heard. Pulmonary/Chest: Breath sounds normal. She has no wheezes. She has no rhonchi. She has no rales. She exhibits no tenderness. Abdominal: Soft. There is no hepatosplenomegaly. There is no tenderness. Musculoskeletal: She exhibits no edema. Right hip: Normal.   Lymphadenopathy:     She has no cervical adenopathy.      She

## 2018-10-04 ASSESSMENT — ENCOUNTER SYMPTOMS
SHORTNESS OF BREATH: 0
CONSTIPATION: 0
DIARRHEA: 0
BLOOD IN STOOL: 0
ABDOMINAL PAIN: 0
WHEEZING: 0
SORE THROAT: 0

## 2019-03-20 LAB
ALT SERPL-CCNC: 24 UNITS/L (ref 9–52)
AST SERPL-CCNC: 20 UNITS/L (ref 14–36)
CHOLESTEROL/HDL RATIO: 2.1 RATIO (ref 0–4.5)
CHOLESTEROL: 108 MG/DL (ref 50–200)
HDL, DIRECT: 52 MG/DL (ref 36–68)
LDL CHOLESTEROL CALCULATED: 44.2 MG/DL (ref 0–160)
TRIGL SERPL-MCNC: 59 MG/DL (ref 10–250)
VLDLC SERPL CALC-MCNC: 12 MG/DL (ref 0–40)

## 2019-04-02 ASSESSMENT — ENCOUNTER SYMPTOMS
BLOOD IN STOOL: 0
CONSTIPATION: 0
WHEEZING: 0
SHORTNESS OF BREATH: 0
DIARRHEA: 0
ABDOMINAL PAIN: 0

## 2019-04-02 NOTE — PROGRESS NOTES
[facility]  [address]  Dept: [phone]  DeptFax: [phone]    Miguel Bosch is a80 y.o. female who presents today for her medical conditions/complaints as noted below. Miguel Bosch is c/o of 6 Month Follow-Up (denies any issues or complaints); Hyperlipidemia; and Hypertension (pt reports her bp med has been adjusted and says she is still running in the lower range )      HPI:     HPI    Routine 6 month check up     Hypertension . She is taking metoprolol but only taking one-half of a 25 mg tablet. Says the dose was reduced by nephrology. He checks her blood pressures occasionally. The systolic is in the low 227E, below 120. She does feel a bit tired but she is not sure if that's due to her age. She denies chest pain or chest pressure or shortness of breath or edema. She does take furosemide 40 mg daily for fluid balance. No orthopnea or paroxysmal nocturnal dyspnea. .  Blood pressure for us is 104/72     Hyperlipidemia  She is now taking one half of a 40 mg tablet of atorvastatin. Her recentt lipid profile showed her to have a cholesterol of 108, HDL 52 and LDL 44. She has no side effects with the medication. No muscle aches or arthralgias other than one might expect at age 80.      Coronary artery disease  She is followed by Dr. Matthew Brown. She last saw him in just a couple of weeks ago. She has a history of a drug-eluting stent to the right coronary artery years ago. In 2015, she did have a stress test which showed a previous anterior infarct but no ischemia. She is on isosorbide mononitrate 30 mg but she has never used a sublingual nitroglycerin.     Chronic kidney disease/status post nephrectomy  She is followed by Dr. Mahin Dietz twice a year and has multiple labs done for him. Due to see Dr Mahin Dietz next week. She is on Calcitrol and vitamin D. She also has a history of a nephrectomy.   A left nephrectomy for renal cancer  She had undergone a left nephrectomy years ago, actually back in 1996. Chris Pabon Remains on low-dose of furosemide     Osteoporosis  She believes that her Prolia infusions are helping her. It apparently helps some of her arthritic complaints.   She is due to get another Prolia injection in about a week     BP Readings from Last 3 Encounters:   04/03/19 104/72   10/02/18 124/78   08/02/18 110/74            (goal 120/80)    Past Medical History:   Diagnosis Date    Acute renal failure (HCC)     Adrenal mass (HCC)     stable    Aseptic necrosis (HCC)     right hip    CAD (coronary artery disease)     COPD (chronic obstructive pulmonary disease) (HCC)     good bronchodilator response  and mild reduction in DLCO    Left ventricular dysfunction     Osteoporosis     Renal cell carcinoma ([address] 1996    Small bowel obstruction ([address] 2006    ischemic bowel      Past Surgical History:   Procedure Laterality Date    AV FISTULA REPAIR  2007    enterocutaneous    CHOLECYSTECTOMY  2007    CORONARY ANGIOPLASTY  02/2015    [facility] normal    CORONARY ANGIOPLASTY WITH STENT PLACEMENT  1998    RCA twice in 1998 (once for IWMI; repeat for re stenosis)   Desma Lek DILATION  [id number]    FEMUR FRACTURE SURGERY Right 2002    HERNIA REPAIR  2007    ventral hernia-Dr Sandoval    HERNIA REPAIR  05/2009    complex ventral Dr Gracia Griffin Right 2006    SMALL INTESTINE SURGERY  2006    ischemic bowel    TOTAL NEPHRECTOMY  1996    UPPER GASTROINTESTINAL ENDOSCOPY  06/2007    Dr Mala Betts     Family History   Problem Relation Age of Onset    Breast Cancer Mother     Diabetes Mother     Coronary Art Dis Father      Social History     Tobacco Use    Smoking status: Former Smoker    Smokeless tobacco: Never Used   Substance Use Topics    Alcohol use: No        Current Outpatient Medications   Medication Sig Dispense Refill    metoprolol succinate (TOPROL XL) 25 MG extended release tablet Take 12.5 mg by mouth      furosemide (LASIX) 40 MG tablet Take 1 tablet by mouth daily 90 tablet 3    atorvastatin (LIPITOR) 20 MG tablet Take 1 tablet by mouth daily 90 tablet 3    denosumab (PROLIA) 60 MG/ML SOLN SC injection Infuse 1 mL intravenously every 6 months 1.8 mL 0    Cholecalciferol (VITAMIN D) 2000 units CAPS capsule Take 1 capsule by mouth daily      aspirin 81 MG tablet Take 1 tablet by mouth daily      calcitRIOL (ROCALTROL) 0.25 MCG capsule Take 1 capsule by mouth three times a week      isosorbide mononitrate (IMDUR) 30 MG extended release tablet Take 1 tablet by mouth daily      nitroGLYCERIN (NITROSTAT) 0.4 MG SL tablet        No current facility-administered medications for this visit. Allergies   Allergen Reactions    Alendronate      Back pain    Codeine      Other reaction(s): Intolerance-unknown  Other reaction(s): Intolerance-unknown  Other reaction(s): Intolerance-unknown    Fentanyl      Other reaction(s): Intolerance-unknown  Other reaction(s): Intolerance-unknown  Other reaction(s): Intolerance-unknown    Hydrocodone-Acetaminophen      Other reaction(s): Intolerance-unknown  Other reaction(s): Intolerance-unknown  Other reaction(s): Intolerance-unknown    Iodides      Other reaction(s): Intolerance-unknown  Other reaction(s): Intolerance-unknown  Other reaction(s): Intolerance-unknown    Lisinopril      Other reaction(s): Intolerance-unknown  Other reaction(s): Intolerance-unknown  Other reaction(s): Intolerance-unknown  Other reaction(s): Intolerance-unknown  Other reaction(s): Intolerance-unknown  Other reaction(s):  Intolerance-unknown       Health Maintenance   Topic Date Due    Shingles Vaccine (1 of 2) 10/07/1976    DTaP/Tdap/Td vaccine (2 - Tdap) 09/04/2008    Potassium monitoring  03/28/2019    Creatinine monitoring  03/30/2019    Flu vaccine  Completed    Pneumococcal 65+ years Vaccine  Completed       Lab Results   Component Value Date    K 3.7 03/28/2018 CREATININE 1.35 03/30/2018    AST 20 03/20/2019    ALT 24 03/20/2019    HCT 42.7 03/28/2018    GLUCOSE 88 03/28/2018    MG 1.8 03/28/2018    CALCIUM 9.5 10/02/2018    VITD25 40 03/28/2018      Lab Results   Component Value Date    CHOL 108 03/20/2019    CHOL 128 03/22/2017    TRIG 59 03/20/2019    HDL 61 03/22/2017       Subjective:      Review of Systems   Constitutional: Positive for fatigue. Negative for appetite change, chills and fever. HENT: Negative. Respiratory: Negative for cough, shortness of breath and wheezing. Cardiovascular: Negative for chest pain, palpitations and leg swelling. Gastrointestinal: Negative for abdominal pain, blood in stool, constipation and diarrhea. Genitourinary: Negative for dysuria and hematuria. Musculoskeletal: Negative for arthralgias and gait problem. Neurological: Negative. Hematological: Negative for adenopathy. Psychiatric/Behavioral: Negative. Objective:     /72   Pulse 74   Ht 5' 2.9\" (1.598 m)   Wt 148 lb (67.1 kg)   SpO2 95%   BMI 26.30 kg/m²     Physical Exam   Constitutional: She appears well-developed and well-nourished. Appears years younger than her stated age   HENT:   Mouth/Throat: Oropharynx is clear and moist and mucous membranes are normal.   Bilateral hearing prostheses   Neck: Neck supple. Carotid bruit is not present. No thyromegaly present. Cardiovascular: Normal rate, regular rhythm, S1 normal and S2 normal.   No murmur heard. Pulmonary/Chest: Breath sounds normal. She has no wheezes. She has no rhonchi. She has no rales. She exhibits no tenderness. Abdominal: Soft. There is no hepatosplenomegaly. There is no tenderness. Musculoskeletal: She exhibits no edema. Right hip: Normal.   Lymphadenopathy:     She has no cervical adenopathy. She has no axillary adenopathy. Neurological: No cranial nerve deficit. Vitals reviewed. Assessment:      Diagnosis Orders   1.  Atherosclerosis of native coronary artery of native heart without angina pectoris  furosemide (LASIX) 40 MG tablet   2. Essential hypertension     3. Age-related osteoporosis without current pathological fracture     4. Hyperparathyroidism ([address]     5. Chronic kidney disease, unspecified CKD stage     6. Mixed hyperlipidemia     7. Malignant neoplasm of left kidney [facility])        Quality & Risk Score Accuracy    Last edited 04/03/19 09:36 EDT by Mora Diaz MD           POC Testing Results (If Applicable):  No results found for this visit on 04/03/19. Plan:     She'll continue current medications. Noting the decreased doses of her atorvastatin and metoprolol. I don't recommend stopping the metoprolol totally. Due to its cardioprotective effects. We'll defer to Dr. Heidi Liz comes to that. Don't believe that her fatigue is related to her blood pressure. She'll follow-up with nephrology. She'll follow-up with me in 6 months. She'll get her Prolia injection next month. Return sooner if any problems    Orders Given:  No orders of the defined types were placed in this encounter. Prescriptions:    Orders Placed This Encounter   Medications    furosemide (LASIX) 40 MG tablet     Sig: Take 1 tablet by mouth daily     Dispense:  90 tablet     Refill:  3    atorvastatin (LIPITOR) 20 MG tablet     Sig: Take 1 tablet by mouth daily     Dispense:  90 tablet     Refill:  3        Return in about 6 months (around 10/3/2019). Electronically signed by Mora Diaz MD on4/3/2019. **This report has been created using voice recognition software. It may contain minor errors which are inherent in voice recognition technology. **

## 2019-04-03 ENCOUNTER — OFFICE VISIT (OUTPATIENT)
Dept: FAMILY MEDICINE CLINIC | Age: 84
End: 2019-04-03
Payer: MEDICARE

## 2019-04-03 VITALS
OXYGEN SATURATION: 95 % | SYSTOLIC BLOOD PRESSURE: 102 MMHG | HEIGHT: 63 IN | WEIGHT: 148 LBS | DIASTOLIC BLOOD PRESSURE: 80 MMHG | HEART RATE: 74 BPM | BODY MASS INDEX: 26.22 KG/M2

## 2019-04-03 DIAGNOSIS — N18.9 CHRONIC KIDNEY DISEASE, UNSPECIFIED CKD STAGE: ICD-10-CM

## 2019-04-03 DIAGNOSIS — I10 ESSENTIAL HYPERTENSION: ICD-10-CM

## 2019-04-03 DIAGNOSIS — C64.2 MALIGNANT NEOPLASM OF LEFT KIDNEY (HCC): ICD-10-CM

## 2019-04-03 DIAGNOSIS — N18.30 CHRONIC KIDNEY DISEASE, STAGE III (MODERATE) (HCC): ICD-10-CM

## 2019-04-03 DIAGNOSIS — E21.3 HYPERPARATHYROIDISM (HCC): ICD-10-CM

## 2019-04-03 DIAGNOSIS — E78.2 MIXED HYPERLIPIDEMIA: ICD-10-CM

## 2019-04-03 DIAGNOSIS — M81.0 AGE-RELATED OSTEOPOROSIS WITHOUT CURRENT PATHOLOGICAL FRACTURE: ICD-10-CM

## 2019-04-03 DIAGNOSIS — I25.10 ATHEROSCLEROSIS OF NATIVE CORONARY ARTERY OF NATIVE HEART WITHOUT ANGINA PECTORIS: Primary | ICD-10-CM

## 2019-04-03 PROCEDURE — 1123F ACP DISCUSS/DSCN MKR DOCD: CPT | Performed by: FAMILY MEDICINE

## 2019-04-03 PROCEDURE — 4040F PNEUMOC VAC/ADMIN/RCVD: CPT | Performed by: FAMILY MEDICINE

## 2019-04-03 PROCEDURE — 1036F TOBACCO NON-USER: CPT | Performed by: FAMILY MEDICINE

## 2019-04-03 PROCEDURE — 1090F PRES/ABSN URINE INCON ASSESS: CPT | Performed by: FAMILY MEDICINE

## 2019-04-03 PROCEDURE — G8427 DOCREV CUR MEDS BY ELIG CLIN: HCPCS | Performed by: FAMILY MEDICINE

## 2019-04-03 PROCEDURE — G8598 ASA/ANTIPLAT THER USED: HCPCS | Performed by: FAMILY MEDICINE

## 2019-04-03 PROCEDURE — G8419 CALC BMI OUT NRM PARAM NOF/U: HCPCS | Performed by: FAMILY MEDICINE

## 2019-04-03 PROCEDURE — 99214 OFFICE O/P EST MOD 30 MIN: CPT | Performed by: FAMILY MEDICINE

## 2019-04-03 RX ORDER — ATORVASTATIN CALCIUM 40 MG/1
40 TABLET, FILM COATED ORAL DAILY
Qty: 90 TABLET | Refills: 3 | Status: CANCELLED | OUTPATIENT
Start: 2019-04-03

## 2019-04-03 RX ORDER — ATORVASTATIN CALCIUM 20 MG/1
20 TABLET, FILM COATED ORAL DAILY
Qty: 90 TABLET | Refills: 3 | Status: SHIPPED | OUTPATIENT
Start: 2019-04-03 | End: 2019-10-07 | Stop reason: SDUPTHER

## 2019-04-03 RX ORDER — METOPROLOL SUCCINATE 25 MG/1
12.5 TABLET, EXTENDED RELEASE ORAL
COMMUNITY
Start: 2019-03-15 | End: 2019-10-07 | Stop reason: SDUPTHER

## 2019-04-03 RX ORDER — FUROSEMIDE 40 MG/1
40 TABLET ORAL DAILY
Qty: 90 TABLET | Refills: 3 | Status: SHIPPED | OUTPATIENT
Start: 2019-04-03 | End: 2019-10-07 | Stop reason: SDUPTHER

## 2019-04-03 ASSESSMENT — PATIENT HEALTH QUESTIONNAIRE - PHQ9: DEPRESSION UNABLE TO ASSESS: PT REFUSES

## 2019-04-03 ASSESSMENT — ENCOUNTER SYMPTOMS: COUGH: 0

## 2019-05-02 LAB — CALCIUM SERPL-MCNC: 9 MG/DL (ref 8.4–10.2)

## 2019-06-17 DIAGNOSIS — I10 ESSENTIAL HYPERTENSION: ICD-10-CM

## 2019-06-17 RX ORDER — METOPROLOL SUCCINATE 50 MG/1
TABLET, EXTENDED RELEASE ORAL
Qty: 90 TABLET | Refills: 3 | Status: SHIPPED | OUTPATIENT
Start: 2019-06-17 | End: 2019-10-07 | Stop reason: DRUGHIGH

## 2019-06-17 NOTE — TELEPHONE ENCOUNTER
Amparo Munoz is calling to request a refill on the following medication(s):  Requested Prescriptions     Pending Prescriptions Disp Refills    metoprolol succinate (TOPROL XL) 50 MG extended release tablet [Pharmacy Med Name: METOPROLOL SUCC ER 50MG TABLET] 90 tablet 3     Sig: TAKE 1 TABLET BY MOUTH  DAILY       Last Visit Date (If Applicable):  2/4/6895    Next Visit Date:    10/7/2019

## 2019-10-07 ENCOUNTER — OFFICE VISIT (OUTPATIENT)
Dept: FAMILY MEDICINE CLINIC | Age: 84
End: 2019-10-07
Payer: MEDICARE

## 2019-10-07 VITALS
SYSTOLIC BLOOD PRESSURE: 114 MMHG | WEIGHT: 147 LBS | DIASTOLIC BLOOD PRESSURE: 72 MMHG | HEART RATE: 62 BPM | OXYGEN SATURATION: 97 % | BODY MASS INDEX: 26.12 KG/M2

## 2019-10-07 DIAGNOSIS — N18.9 CHRONIC KIDNEY DISEASE, UNSPECIFIED CKD STAGE: ICD-10-CM

## 2019-10-07 DIAGNOSIS — C64.2 MALIGNANT NEOPLASM OF LEFT KIDNEY (HCC): ICD-10-CM

## 2019-10-07 DIAGNOSIS — I25.10 ATHEROSCLEROSIS OF NATIVE CORONARY ARTERY OF NATIVE HEART WITHOUT ANGINA PECTORIS: ICD-10-CM

## 2019-10-07 DIAGNOSIS — I10 ESSENTIAL HYPERTENSION: ICD-10-CM

## 2019-10-07 DIAGNOSIS — M81.0 AGE-RELATED OSTEOPOROSIS WITHOUT CURRENT PATHOLOGICAL FRACTURE: ICD-10-CM

## 2019-10-07 DIAGNOSIS — Z23 NEED FOR PROPHYLACTIC VACCINATION AND INOCULATION AGAINST INFLUENZA: ICD-10-CM

## 2019-10-07 DIAGNOSIS — E78.2 MIXED HYPERLIPIDEMIA: ICD-10-CM

## 2019-10-07 DIAGNOSIS — I25.10 CORONARY ARTERY DISEASE INVOLVING NATIVE CORONARY ARTERY OF NATIVE HEART WITHOUT ANGINA PECTORIS: Primary | ICD-10-CM

## 2019-10-07 PROCEDURE — 4040F PNEUMOC VAC/ADMIN/RCVD: CPT | Performed by: FAMILY MEDICINE

## 2019-10-07 PROCEDURE — 1123F ACP DISCUSS/DSCN MKR DOCD: CPT | Performed by: FAMILY MEDICINE

## 2019-10-07 PROCEDURE — 90653 IIV ADJUVANT VACCINE IM: CPT | Performed by: FAMILY MEDICINE

## 2019-10-07 PROCEDURE — G8427 DOCREV CUR MEDS BY ELIG CLIN: HCPCS | Performed by: FAMILY MEDICINE

## 2019-10-07 PROCEDURE — 1090F PRES/ABSN URINE INCON ASSESS: CPT | Performed by: FAMILY MEDICINE

## 2019-10-07 PROCEDURE — G8482 FLU IMMUNIZE ORDER/ADMIN: HCPCS | Performed by: FAMILY MEDICINE

## 2019-10-07 PROCEDURE — 99214 OFFICE O/P EST MOD 30 MIN: CPT | Performed by: FAMILY MEDICINE

## 2019-10-07 PROCEDURE — G8419 CALC BMI OUT NRM PARAM NOF/U: HCPCS | Performed by: FAMILY MEDICINE

## 2019-10-07 PROCEDURE — 1036F TOBACCO NON-USER: CPT | Performed by: FAMILY MEDICINE

## 2019-10-07 PROCEDURE — G8598 ASA/ANTIPLAT THER USED: HCPCS | Performed by: FAMILY MEDICINE

## 2019-10-07 PROCEDURE — G0008 ADMIN INFLUENZA VIRUS VAC: HCPCS | Performed by: FAMILY MEDICINE

## 2019-10-07 RX ORDER — FUROSEMIDE 40 MG/1
40 TABLET ORAL DAILY
Qty: 90 TABLET | Refills: 3 | Status: SHIPPED | OUTPATIENT
Start: 2019-10-07 | End: 2021-06-03

## 2019-10-07 RX ORDER — ATORVASTATIN CALCIUM 20 MG/1
20 TABLET, FILM COATED ORAL DAILY
Qty: 90 TABLET | Refills: 3 | Status: SHIPPED | OUTPATIENT
Start: 2019-10-07 | End: 2021-08-17 | Stop reason: SDUPTHER

## 2019-10-07 RX ORDER — METOPROLOL SUCCINATE 25 MG/1
12.5 TABLET, EXTENDED RELEASE ORAL DAILY
Qty: 90 TABLET | Refills: 3 | Status: SHIPPED | OUTPATIENT
Start: 2019-10-07 | End: 2021-07-12 | Stop reason: SDUPTHER

## 2019-10-07 RX ORDER — ISOSORBIDE MONONITRATE 30 MG/1
15 TABLET, EXTENDED RELEASE ORAL DAILY
Qty: 30 TABLET | Refills: 0 | Status: SHIPPED
Start: 2019-10-07 | End: 2021-06-03

## 2019-10-07 ASSESSMENT — ENCOUNTER SYMPTOMS
DIARRHEA: 1
BLOOD IN STOOL: 0
COUGH: 0
ABDOMINAL PAIN: 0
SHORTNESS OF BREATH: 0
CONSTIPATION: 0
WHEEZING: 0

## 2019-10-07 ASSESSMENT — PATIENT HEALTH QUESTIONNAIRE - PHQ9
2. FEELING DOWN, DEPRESSED OR HOPELESS: 0
SUM OF ALL RESPONSES TO PHQ QUESTIONS 1-9: 0
1. LITTLE INTEREST OR PLEASURE IN DOING THINGS: 0
SUM OF ALL RESPONSES TO PHQ QUESTIONS 1-9: 0
SUM OF ALL RESPONSES TO PHQ9 QUESTIONS 1 & 2: 0

## 2019-11-21 ENCOUNTER — HOSPITAL ENCOUNTER (OUTPATIENT)
Age: 84
Setting detail: SPECIMEN
Discharge: HOME | End: 2019-11-21
Payer: MEDICARE

## 2019-11-21 ENCOUNTER — OFFICE VISIT (OUTPATIENT)
Dept: FAMILY MEDICINE CLINIC | Age: 84
End: 2019-11-21
Payer: MEDICARE

## 2019-11-21 VITALS
DIASTOLIC BLOOD PRESSURE: 70 MMHG | BODY MASS INDEX: 25.77 KG/M2 | SYSTOLIC BLOOD PRESSURE: 120 MMHG | HEART RATE: 68 BPM | WEIGHT: 145 LBS | OXYGEN SATURATION: 98 %

## 2019-11-21 DIAGNOSIS — R14.0 ABDOMINAL DISTENTION: Primary | ICD-10-CM

## 2019-11-21 DIAGNOSIS — Z66 DNR (DO NOT RESUSCITATE): ICD-10-CM

## 2019-11-21 DIAGNOSIS — N18.9 CHRONIC KIDNEY DISEASE, UNSPECIFIED CKD STAGE: ICD-10-CM

## 2019-11-21 DIAGNOSIS — R14.0 ABDOMINAL DISTENTION: ICD-10-CM

## 2019-11-21 DIAGNOSIS — I25.10 CORONARY ARTERY DISEASE INVOLVING NATIVE CORONARY ARTERY OF NATIVE HEART WITHOUT ANGINA PECTORIS: ICD-10-CM

## 2019-11-21 LAB
ANION GAP SERPL CALCULATED.3IONS-SCNC: 13 MMOL/L (ref 9–17)
BUN BLDV-MCNC: 26 MG/DL (ref 8–23)
BUN/CREAT BLD: 19 (ref 9–20)
CALCIUM SERPL-MCNC: 10.1 MG/DL (ref 8.6–10.4)
CHLORIDE BLD-SCNC: 100 MMOL/L (ref 98–107)
CO2: 26 MMOL/L (ref 20–31)
CREAT SERPL-MCNC: 1.38 MG/DL (ref 0.5–0.9)
GFR AFRICAN AMERICAN: 43 ML/MIN
GFR NON-AFRICAN AMERICAN: 36 ML/MIN
GFR SERPL CREATININE-BSD FRML MDRD: ABNORMAL ML/MIN/{1.73_M2}
GFR SERPL CREATININE-BSD FRML MDRD: ABNORMAL ML/MIN/{1.73_M2}
GLUCOSE BLD-MCNC: 108 MG/DL (ref 70–99)
POTASSIUM SERPL-SCNC: 3.2 MMOL/L (ref 3.7–5.3)
SODIUM BLD-SCNC: 139 MMOL/L (ref 135–144)

## 2019-11-21 PROCEDURE — 99213 OFFICE O/P EST LOW 20 MIN: CPT | Performed by: FAMILY MEDICINE

## 2019-11-21 PROCEDURE — G8482 FLU IMMUNIZE ORDER/ADMIN: HCPCS | Performed by: FAMILY MEDICINE

## 2019-11-21 PROCEDURE — G8598 ASA/ANTIPLAT THER USED: HCPCS | Performed by: FAMILY MEDICINE

## 2019-11-21 PROCEDURE — G8417 CALC BMI ABV UP PARAM F/U: HCPCS | Performed by: FAMILY MEDICINE

## 2019-11-21 PROCEDURE — 80048 BASIC METABOLIC PNL TOTAL CA: CPT

## 2019-11-21 PROCEDURE — 1123F ACP DISCUSS/DSCN MKR DOCD: CPT | Performed by: FAMILY MEDICINE

## 2019-11-21 PROCEDURE — 1036F TOBACCO NON-USER: CPT | Performed by: FAMILY MEDICINE

## 2019-11-21 PROCEDURE — 1090F PRES/ABSN URINE INCON ASSESS: CPT | Performed by: FAMILY MEDICINE

## 2019-11-21 PROCEDURE — 36415 COLL VENOUS BLD VENIPUNCTURE: CPT

## 2019-11-21 PROCEDURE — 4040F PNEUMOC VAC/ADMIN/RCVD: CPT | Performed by: FAMILY MEDICINE

## 2019-11-21 PROCEDURE — G8427 DOCREV CUR MEDS BY ELIG CLIN: HCPCS | Performed by: FAMILY MEDICINE

## 2019-11-21 ASSESSMENT — ENCOUNTER SYMPTOMS
WHEEZING: 0
DIARRHEA: 1
SHORTNESS OF BREATH: 0
COUGH: 0
BLOOD IN STOOL: 0
CONSTIPATION: 0
ABDOMINAL PAIN: 0
ABDOMINAL DISTENTION: 1

## 2019-11-22 DIAGNOSIS — R14.0 ABDOMINAL DISTENTION: ICD-10-CM

## 2019-11-25 ENCOUNTER — TELEPHONE (OUTPATIENT)
Dept: FAMILY MEDICINE CLINIC | Age: 84
End: 2019-11-25

## 2019-12-04 ENCOUNTER — HOSPITAL ENCOUNTER (OUTPATIENT)
Age: 84
Setting detail: SPECIMEN
Discharge: HOME | End: 2019-12-04
Payer: MEDICARE

## 2019-12-04 DIAGNOSIS — E78.2 MIXED HYPERLIPIDEMIA: ICD-10-CM

## 2019-12-04 LAB
ALBUMIN SERPL-MCNC: 4.1 G/DL (ref 3.5–5.2)
ALBUMIN/GLOBULIN RATIO: 1.3 (ref 1–2.5)
ALP BLD-CCNC: 83 U/L (ref 35–104)
ALT SERPL-CCNC: 10 U/L (ref 5–33)
AST SERPL-CCNC: 18 U/L
BILIRUB SERPL-MCNC: 1.09 MG/DL (ref 0.3–1.2)
BILIRUBIN DIRECT: 0.3 MG/DL
BILIRUBIN, INDIRECT: 0.79 MG/DL (ref 0–1)
CHOLESTEROL/HDL RATIO: 2
CHOLESTEROL: 118 MG/DL
GLOBULIN: 3.2 G/DL (ref 1.5–3.8)
HDLC SERPL-MCNC: 59 MG/DL
LDL CHOLESTEROL: 39 MG/DL (ref 0–130)
TOTAL PROTEIN: 7.3 G/DL (ref 6.4–8.3)
TRIGL SERPL-MCNC: 102 MG/DL
VLDLC SERPL CALC-MCNC: NORMAL MG/DL (ref 1–30)

## 2019-12-04 PROCEDURE — 80076 HEPATIC FUNCTION PANEL: CPT

## 2019-12-04 PROCEDURE — 80061 LIPID PANEL: CPT

## 2019-12-04 PROCEDURE — 36415 COLL VENOUS BLD VENIPUNCTURE: CPT

## 2020-01-02 ENCOUNTER — TELEPHONE (OUTPATIENT)
Dept: FAMILY MEDICINE CLINIC | Age: 85
End: 2020-01-02

## 2020-01-02 RX ORDER — AZITHROMYCIN 250 MG/1
250 TABLET, FILM COATED ORAL SEE ADMIN INSTRUCTIONS
Qty: 6 TABLET | Refills: 0 | Status: SHIPPED | OUTPATIENT
Start: 2020-01-02 | End: 2020-01-07

## 2020-01-02 NOTE — TELEPHONE ENCOUNTER
I did send a prescription for Z-Andrey to [address] [place] [address]. If she does not improve next few days then she will need to be seen.   Should she develop or alarming symptoms such as productive cough fevers or night sweats then she should present to the ER

## 2020-01-09 ENCOUNTER — OFFICE VISIT (OUTPATIENT)
Dept: FAMILY MEDICINE CLINIC | Age: 85
End: 2020-01-09
Payer: MEDICARE

## 2020-01-09 VITALS
DIASTOLIC BLOOD PRESSURE: 70 MMHG | OXYGEN SATURATION: 97 % | HEIGHT: 63 IN | WEIGHT: 145 LBS | HEART RATE: 87 BPM | BODY MASS INDEX: 25.69 KG/M2 | SYSTOLIC BLOOD PRESSURE: 104 MMHG

## 2020-01-09 PROCEDURE — 99213 OFFICE O/P EST LOW 20 MIN: CPT | Performed by: FAMILY MEDICINE

## 2020-01-09 PROCEDURE — 1123F ACP DISCUSS/DSCN MKR DOCD: CPT | Performed by: FAMILY MEDICINE

## 2020-01-09 PROCEDURE — G8926 SPIRO NO PERF OR DOC: HCPCS | Performed by: FAMILY MEDICINE

## 2020-01-09 PROCEDURE — G8427 DOCREV CUR MEDS BY ELIG CLIN: HCPCS | Performed by: FAMILY MEDICINE

## 2020-01-09 PROCEDURE — 1090F PRES/ABSN URINE INCON ASSESS: CPT | Performed by: FAMILY MEDICINE

## 2020-01-09 PROCEDURE — 4040F PNEUMOC VAC/ADMIN/RCVD: CPT | Performed by: FAMILY MEDICINE

## 2020-01-09 PROCEDURE — G8482 FLU IMMUNIZE ORDER/ADMIN: HCPCS | Performed by: FAMILY MEDICINE

## 2020-01-09 PROCEDURE — 3023F SPIROM DOC REV: CPT | Performed by: FAMILY MEDICINE

## 2020-01-09 PROCEDURE — G8417 CALC BMI ABV UP PARAM F/U: HCPCS | Performed by: FAMILY MEDICINE

## 2020-01-09 PROCEDURE — 1036F TOBACCO NON-USER: CPT | Performed by: FAMILY MEDICINE

## 2020-01-09 RX ORDER — ALBUTEROL SULFATE 2.5 MG/3ML
SOLUTION RESPIRATORY (INHALATION)
COMMUNITY
Start: 2020-01-04 | End: 2021-06-03 | Stop reason: ALTCHOICE

## 2020-01-09 ASSESSMENT — PATIENT HEALTH QUESTIONNAIRE - PHQ9
SUM OF ALL RESPONSES TO PHQ QUESTIONS 1-9: 0
SUM OF ALL RESPONSES TO PHQ9 QUESTIONS 1 & 2: 0
SUM OF ALL RESPONSES TO PHQ QUESTIONS 1-9: 0
2. FEELING DOWN, DEPRESSED OR HOPELESS: 0
1. LITTLE INTEREST OR PLEASURE IN DOING THINGS: 0

## 2020-01-09 ASSESSMENT — ENCOUNTER SYMPTOMS
WHEEZING: 0
SINUS PRESSURE: 0
SORE THROAT: 0
SHORTNESS OF BREATH: 0
DIARRHEA: 0
ABDOMINAL PAIN: 0
RHINORRHEA: 1
SINUS PAIN: 0
BLOOD IN STOOL: 0
COUGH: 1
CONSTIPATION: 0

## 2020-01-09 NOTE — PROGRESS NOTES
[address] [place] [address] [address]  Dept: [phone]  DeptFax: [phone]    Brian Pike is a80 y.o. female who presents today for her medical conditions/complaints as noted below. Brian Pike is c/o of Follow-Up from Hospital (was seen and treated at [facility] for cough and sob still has a slight cough has had bronchitis three times now and wants to know if there is anything more she can do)      HPI:     HPI  Patient returns the office. She had called the office while back requesting an antibiotic. And a Z-Andrey was sent in. Stating that she had a chest cold. However she was not getting better. He was short of breath and coughing. And she went to [facility] ER. Her chest x-ray was clear. They sent her home with a nebulizer and albuterol to be used every 4-6 hours. She is feeling better. She continues to use machine about 3-4 times a day. She did initially have a lot of sinus drainage when this all started. That seemed to cleared up. Her cough is getting better. She has no systemic symptoms. She is concerned about recurrent bouts of bronchitis. Stating that she has had 3 bouts of bronchitis. In reviewing her chart in 2017 she was treated by Dr. Moncho Whitfield with a Z-Andrey. For bronchitis. And apparently she was in the emergency room sometime in November or December and [place] for \"bronchitis \". She is concerned because this episode occurred almost right on top of her previous. I cannot find records from [facility] in November or December prior to this most recent one. She was a smoker of a pack per day for around 20 years. But she quit 20 years ago. Her flu vaccines and pneumococcal vaccines are up-to-date. Her past medical records for past medical history indicate that she has had a diagnosis of COPD with a good bronchodilator response and decreased diffusing capacity.   So that PFT cannot be found it does sound like tablets by mouth daily 90 tablet 3    furosemide (LASIX) 40 MG tablet Take 1 tablet by mouth daily 90 tablet 3    isosorbide mononitrate (IMDUR) 30 MG extended release tablet Take 0.5 tablets by mouth daily 30 tablet 0    denosumab (PROLIA) 60 MG/ML SOLN SC injection Infuse 1 mL intravenously every 6 months 1.8 mL 0    Cholecalciferol (VITAMIN D) 2000 units CAPS capsule Take 1 capsule by mouth daily      aspirin 81 MG tablet Take 1 tablet by mouth daily      calcitRIOL (ROCALTROL) 0.25 MCG capsule Take 1 capsule by mouth three times a week      nitroGLYCERIN (NITROSTAT) 0.4 MG SL tablet        No current facility-administered medications for this visit. Allergies   Allergen Reactions    Alendronate      Back pain    Codeine      Other reaction(s): Intolerance-unknown  Other reaction(s): Intolerance-unknown  Other reaction(s): Intolerance-unknown    Fentanyl      Other reaction(s): Intolerance-unknown  Other reaction(s): Intolerance-unknown  Other reaction(s): Intolerance-unknown    Hydrocodone-Acetaminophen      Other reaction(s): Intolerance-unknown  Other reaction(s): Intolerance-unknown  Other reaction(s): Intolerance-unknown    Iodides      Other reaction(s): Intolerance-unknown  Other reaction(s): Intolerance-unknown  Other reaction(s): Intolerance-unknown    Lisinopril      Other reaction(s): Intolerance-unknown  Other reaction(s): Intolerance-unknown  Other reaction(s): Intolerance-unknown  Other reaction(s): Intolerance-unknown  Other reaction(s): Intolerance-unknown  Other reaction(s):  Intolerance-unknown       Health Maintenance   Topic Date Due    Shingles Vaccine (1 of 2) 10/07/1976    DTaP/Tdap/Td vaccine (2 - Tdap) 09/04/2008    Annual Wellness Visit (AWV)  05/29/2019    Potassium monitoring  11/21/2020    Creatinine monitoring  11/21/2020    Lipid screen  12/04/2020    Flu vaccine  Completed    Pneumococcal 65+ years Vaccine  Completed       Lab Results   Component Value Date

## 2020-03-17 ENCOUNTER — TELEPHONE (OUTPATIENT)
Dept: FAMILY MEDICINE CLINIC | Age: 85
End: 2020-03-17

## 2020-03-17 RX ORDER — LEVOFLOXACIN 250 MG/1
250 TABLET ORAL DAILY
Qty: 10 TABLET | Refills: 0 | Status: SHIPPED | OUTPATIENT
Start: 2020-03-17 | End: 2020-03-27

## 2020-03-17 NOTE — TELEPHONE ENCOUNTER
Patient called stating she has been coughing for the past couple days. She is now wheezing and short of breath. She denies any fever, Denies any travel or being around anyone that has traveled. She states she has stayed home and has not gone out. She states that this feels like bronchitis - the same as when she was treated in January. She would like to know if you will just call in an antibiotic for her. Please advise.

## 2020-03-17 NOTE — TELEPHONE ENCOUNTER
I sent an antibiotic to [facility].   Please make sure that she is using either inhaler or nebulizer in addition to the antibiotic if she does not have any I would need to send that and also

## 2020-03-20 ENCOUNTER — TELEPHONE (OUTPATIENT)
Dept: FAMILY MEDICINE CLINIC | Age: 85
End: 2020-03-20

## 2020-03-20 NOTE — TELEPHONE ENCOUNTER
Patient has been taking in lots of fluids. Dr Ehsan Alvraez advised patient to call EMS and go to the ER. Patient notified.

## 2020-03-20 NOTE — TELEPHONE ENCOUNTER
Patient was recently started on levaquin. She thinks that it is causing her to retain urine. She has only dribbled urine today. She thinks that the last time she urinated normally was yesterday. She lives alone and is unable to come out. The only thing she could do is to call the EMS to take her to the ER. She wants to know if she should stop levaquin? Or is there something else that she can do.

## 2020-05-28 LAB — CALCIUM SERPL-MCNC: 9.1 MG/DL (ref 8.4–10.2)

## 2020-07-31 RX ORDER — FUROSEMIDE 40 MG/1
40 TABLET ORAL DAILY
Qty: 90 TABLET | Refills: 3 | OUTPATIENT
Start: 2020-07-31

## 2020-07-31 NOTE — TELEPHONE ENCOUNTER
Lee Ann Magdaleno is requesting a refill on the following medication(s):  Requested Prescriptions     Pending Prescriptions Disp Refills    furosemide (LASIX) 40 MG tablet 90 tablet 3     Sig: Take 1 tablet by mouth daily       Last Visit Date (If Applicable):  3/0/7882    Next Visit Date:    Visit date not found

## 2020-12-22 LAB — CALCIUM SERPL-MCNC: 8.9 MG/DL (ref 8.4–10.2)

## 2021-06-02 NOTE — PROGRESS NOTES
 REVISION TOTAL HIP ARTHROPLASTY Right 2006    SMALL INTESTINE SURGERY  2006    ischemic bowel    TOTAL NEPHRECTOMY  1996    UPPER GASTROINTESTINAL ENDOSCOPY  06/2007    Dr Ashanti Goldsmith       Family History   Problem Relation Age of Onset    Breast Cancer Mother     Diabetes Mother     Coronary Art Dis Father        Social History     Tobacco Use    Smoking status: Former Smoker    Smokeless tobacco: Never Used   Substance Use Topics    Alcohol use: No      Current Outpatient Medications   Medication Sig Dispense Refill    apixaban (ELIQUIS) 2.5 MG TABS tablet Take by mouth 2 times daily      atorvastatin (LIPITOR) 20 MG tablet Take 1 tablet by mouth daily 90 tablet 3    metoprolol succinate (TOPROL XL) 25 MG extended release tablet Take 0.5 tablets by mouth daily 90 tablet 3    denosumab (PROLIA) 60 MG/ML SOLN SC injection Infuse 1 mL intravenously every 6 months 1.8 mL 0    Cholecalciferol (VITAMIN D) 2000 units CAPS capsule Take 1 capsule by mouth daily      aspirin 81 MG tablet Take 1 tablet by mouth daily      nitroGLYCERIN (NITROSTAT) 0.4 MG SL tablet        No current facility-administered medications for this visit. Allergies   Allergen Reactions    Alendronate      Back pain    Codeine      Other reaction(s): Intolerance-unknown  Other reaction(s): Intolerance-unknown  Other reaction(s): Intolerance-unknown    Fentanyl      Other reaction(s): Intolerance-unknown  Other reaction(s): Intolerance-unknown  Other reaction(s): Intolerance-unknown    Hydrocodone-Acetaminophen      Other reaction(s): Intolerance-unknown  Other reaction(s): Intolerance-unknown  Other reaction(s): Intolerance-unknown    Iodides      Other reaction(s): Intolerance-unknown  Other reaction(s): Intolerance-unknown  Other reaction(s): Intolerance-unknown    Lisinopril      Other reaction(s): Intolerance-unknown  Other reaction(s): Intolerance-unknown  Other reaction(s):  Intolerance-unknown  Other reaction(s): Intolerance-unknown  Other reaction(s): Intolerance-unknown  Other reaction(s): Intolerance-unknown    Blue Dyes (Parenteral) Rash       Health Maintenance   Topic Date Due    COVID-19 Vaccine (1) Never done    Shingles Vaccine (1 of 2) Never done    DTaP/Tdap/Td vaccine (2 - Tdap) 09/04/2008    Annual Wellness Visit (AWV)  Never done    Lipid screen  12/04/2020    Potassium monitoring  05/08/2021    Creatinine monitoring  05/08/2021    Flu vaccine  Completed    Pneumococcal 65+ years Vaccine  Completed    Hepatitis A vaccine  Aged Out    Hepatitis B vaccine  Aged Out    Hib vaccine  Aged Out    Meningococcal (ACWY) vaccine  Aged Out       Subjective:      Review of Systems   Constitutional: Positive for fatigue. Negative for appetite change. HENT: Negative for congestion, ear pain and sinus pressure. Eyes: Positive for visual disturbance. Left eye - vision is not good. Respiratory: Negative for cough and shortness of breath. Cardiovascular: Negative for chest pain, palpitations and leg swelling. Gastrointestinal: Positive for diarrhea. Negative for constipation. H/o GI issues (loose stools) since fistula surgery. Genitourinary: Negative for dysuria. Musculoskeletal: Negative for myalgias. Skin: Positive for color change. Neurological: Negative for dizziness and headaches. Home BP Checks?no  Medication Compliant? yes    Objective:     /80 (Site: Right Upper Arm, Position: Sitting, Cuff Size: Medium Adult)   Pulse 85   Temp 99.3 °F (37.4 °C)   Resp 20   Ht 5' 2.5\" (1.588 m)   Wt 146 lb (66.2 kg)   SpO2 98%   BMI 26.28 kg/m²   Physical Exam  Vitals reviewed. Constitutional:       General: She is not in acute distress. Appearance: She is well-developed. HENT:      Head: Atraumatic. Eyes:      Conjunctiva/sclera: Conjunctivae normal.   Neck:      Thyroid: No thyromegaly. Vascular: No carotid bruit.    Cardiovascular:      Rate and Rhythm: Normal rate and regular rhythm. Heart sounds: No murmur heard. Pulmonary:      Effort: Pulmonary effort is normal.      Breath sounds: Normal breath sounds. Abdominal:      General: Bowel sounds are normal.      Palpations: Abdomen is soft. Musculoskeletal:         General: No swelling (BLE). Cervical back: Neck supple. Neurological:      Mental Status: She is alert and oriented to person, place, and time. Psychiatric:         Thought Content: Thought content normal.         Judgment: Judgment normal.           Assessment:      1. Essential hypertension    2. Chronic obstructive pulmonary disease, unspecified COPD type ([facility]    3. Malignant neoplasm of left kidney ([facility]    4. Hyperparathyroidism ([facility]    5. Age-related osteoporosis without current pathological fracture    6. Glucose intolerance    7. Screening for diabetes mellitus    8. Hyperglycemia, unspecified              Plan:     Patient Instructions   May get shingrix vaccine and Tdap at pharmacy if desired. Consider extra dose toprol if rapid heart beat persisting   May follow in November/December if desird. Orders Placed This Encounter   Procedures    DEXA BONE DENSITY 2 SITES     Standing Status:   Future     Standing Expiration Date:   8/3/2021    Electrolyte Panel     Standing Status:   Future     Standing Expiration Date:   6/3/2022    Creatinine, Serum     Standing Status:   Future     Standing Expiration Date:   6/3/2022    Calcium     Standing Status:   Future     Standing Expiration Date:   6/3/2022    Glucose, Fasting     Standing Status:   Future     Standing Expiration Date:   6/3/2022    Hemoglobin A1C     Standing Status:   Future     Standing Expiration Date:   6/3/2022     No orders of the defined types were placed in this encounter. Return in about 1 year (around 6/3/2022) for HTN, osteoporosis. Discussed use, benefit, and side effects of prescribed medications. All patient questions answered. Pt voiced understanding. Reviewed health maintenance-lab, covid accomplished, Tdap and shingrix reviewed. Instructed to continue current medications, diet and exercise. Patient agreed with treatment plan. Follow up as directed.      Electronically signedby Suleiman Collins MD on 6/3/2021

## 2021-06-03 ENCOUNTER — OFFICE VISIT (OUTPATIENT)
Dept: FAMILY MEDICINE CLINIC | Age: 86
End: 2021-06-03
Payer: MEDICARE

## 2021-06-03 VITALS
OXYGEN SATURATION: 98 % | HEIGHT: 63 IN | HEART RATE: 85 BPM | WEIGHT: 146 LBS | TEMPERATURE: 99.3 F | RESPIRATION RATE: 20 BRPM | BODY MASS INDEX: 25.87 KG/M2 | DIASTOLIC BLOOD PRESSURE: 80 MMHG | SYSTOLIC BLOOD PRESSURE: 134 MMHG

## 2021-06-03 DIAGNOSIS — C64.2 MALIGNANT NEOPLASM OF LEFT KIDNEY (HCC): ICD-10-CM

## 2021-06-03 DIAGNOSIS — R73.9 HYPERGLYCEMIA, UNSPECIFIED: ICD-10-CM

## 2021-06-03 DIAGNOSIS — J44.9 CHRONIC OBSTRUCTIVE PULMONARY DISEASE, UNSPECIFIED COPD TYPE (HCC): ICD-10-CM

## 2021-06-03 DIAGNOSIS — E74.39 GLUCOSE INTOLERANCE: ICD-10-CM

## 2021-06-03 DIAGNOSIS — M81.0 AGE-RELATED OSTEOPOROSIS WITHOUT CURRENT PATHOLOGICAL FRACTURE: ICD-10-CM

## 2021-06-03 DIAGNOSIS — I10 ESSENTIAL HYPERTENSION: Primary | ICD-10-CM

## 2021-06-03 DIAGNOSIS — E21.3 HYPERPARATHYROIDISM (HCC): ICD-10-CM

## 2021-06-03 DIAGNOSIS — Z13.1 SCREENING FOR DIABETES MELLITUS: ICD-10-CM

## 2021-06-03 PROCEDURE — 1036F TOBACCO NON-USER: CPT | Performed by: FAMILY MEDICINE

## 2021-06-03 PROCEDURE — G8427 DOCREV CUR MEDS BY ELIG CLIN: HCPCS | Performed by: FAMILY MEDICINE

## 2021-06-03 PROCEDURE — G8417 CALC BMI ABV UP PARAM F/U: HCPCS | Performed by: FAMILY MEDICINE

## 2021-06-03 PROCEDURE — 3023F SPIROM DOC REV: CPT | Performed by: FAMILY MEDICINE

## 2021-06-03 PROCEDURE — G8926 SPIRO NO PERF OR DOC: HCPCS | Performed by: FAMILY MEDICINE

## 2021-06-03 PROCEDURE — 99212 OFFICE O/P EST SF 10 MIN: CPT | Performed by: FAMILY MEDICINE

## 2021-06-03 PROCEDURE — 1123F ACP DISCUSS/DSCN MKR DOCD: CPT | Performed by: FAMILY MEDICINE

## 2021-06-03 PROCEDURE — 99214 OFFICE O/P EST MOD 30 MIN: CPT | Performed by: FAMILY MEDICINE

## 2021-06-03 PROCEDURE — 1090F PRES/ABSN URINE INCON ASSESS: CPT | Performed by: FAMILY MEDICINE

## 2021-06-03 PROCEDURE — 4040F PNEUMOC VAC/ADMIN/RCVD: CPT | Performed by: FAMILY MEDICINE

## 2021-06-03 SDOH — ECONOMIC STABILITY: FOOD INSECURITY: WITHIN THE PAST 12 MONTHS, YOU WORRIED THAT YOUR FOOD WOULD RUN OUT BEFORE YOU GOT MONEY TO BUY MORE.: NEVER TRUE

## 2021-06-03 SDOH — ECONOMIC STABILITY: FOOD INSECURITY: WITHIN THE PAST 12 MONTHS, THE FOOD YOU BOUGHT JUST DIDN'T LAST AND YOU DIDN'T HAVE MONEY TO GET MORE.: NEVER TRUE

## 2021-06-03 ASSESSMENT — PATIENT HEALTH QUESTIONNAIRE - PHQ9
SUM OF ALL RESPONSES TO PHQ QUESTIONS 1-9: 0
1. LITTLE INTEREST OR PLEASURE IN DOING THINGS: 0
2. FEELING DOWN, DEPRESSED OR HOPELESS: 0
SUM OF ALL RESPONSES TO PHQ QUESTIONS 1-9: 0
SUM OF ALL RESPONSES TO PHQ QUESTIONS 1-9: 0
SUM OF ALL RESPONSES TO PHQ9 QUESTIONS 1 & 2: 0

## 2021-06-03 ASSESSMENT — ENCOUNTER SYMPTOMS
SINUS PRESSURE: 0
COLOR CHANGE: 1
CONSTIPATION: 0
DIARRHEA: 1
COUGH: 0
SHORTNESS OF BREATH: 0

## 2021-06-03 ASSESSMENT — SOCIAL DETERMINANTS OF HEALTH (SDOH): HOW HARD IS IT FOR YOU TO PAY FOR THE VERY BASICS LIKE FOOD, HOUSING, MEDICAL CARE, AND HEATING?: NOT HARD AT ALL

## 2021-06-07 ENCOUNTER — TELEPHONE (OUTPATIENT)
Dept: FAMILY MEDICINE CLINIC | Age: 86
End: 2021-06-07

## 2021-06-23 LAB — CALCIUM SERPL-MCNC: 9.1 MG/DL (ref 8.4–10.2)

## 2021-07-12 RX ORDER — METOPROLOL SUCCINATE 25 MG/1
12.5 TABLET, EXTENDED RELEASE ORAL DAILY
Qty: 90 TABLET | Refills: 1 | Status: SHIPPED | OUTPATIENT
Start: 2021-07-12 | End: 2021-11-08 | Stop reason: SDUPTHER

## 2021-08-17 RX ORDER — ATORVASTATIN CALCIUM 20 MG/1
20 TABLET, FILM COATED ORAL DAILY
Qty: 90 TABLET | Refills: 3 | Status: SHIPPED | OUTPATIENT
Start: 2021-08-17 | End: 2021-11-08 | Stop reason: SDUPTHER

## 2021-08-17 NOTE — TELEPHONE ENCOUNTER
Claudia Newman is requesting a refill on the following medication(s):  Requested Prescriptions     Pending Prescriptions Disp Refills    atorvastatin (LIPITOR) 20 MG tablet 90 tablet 3     Sig: Take 1 tablet by mouth daily       Last Visit Date (If Applicable):  6/6/6673    Next Visit Date:    Visit date not found

## 2021-10-18 ENCOUNTER — TELEPHONE (OUTPATIENT)
Dept: FAMILY MEDICINE CLINIC | Age: 86
End: 2021-10-18

## 2021-10-18 NOTE — TELEPHONE ENCOUNTER
----- Message from Pedro Al sent at 10/13/2021 12:27 PM EDT -----  Subject: Message to Provider    QUESTIONS  Information for Provider? Hattie Nazario wanted to make sure we got the physicians   orders for October and that they were signed and faxed back today. Was   unable to get through to practice after calling multiple times. Also needs   orders for standing orders. ---------------------------------------------------------------------------  --------------  Radha ORLANDO  What is the best way for the office to contact you? OK to leave message on   voicemail  Preferred Call Back Phone Number? [phone]  ---------------------------------------------------------------------------  --------------  SCRIPT ANSWERS  Relationship to Patient? Third Party  Representative Name?  Yohana Farr from [facility]

## 2021-11-08 RX ORDER — ATORVASTATIN CALCIUM 20 MG/1
20 TABLET, FILM COATED ORAL DAILY
Qty: 90 TABLET | Refills: 3 | Status: SHIPPED | OUTPATIENT
Start: 2021-11-08

## 2021-11-08 RX ORDER — METOPROLOL SUCCINATE 25 MG/1
12.5 TABLET, EXTENDED RELEASE ORAL DAILY
Qty: 90 TABLET | Refills: 1 | Status: SHIPPED | OUTPATIENT
Start: 2021-11-08

## 2021-11-08 NOTE — TELEPHONE ENCOUNTER
Scotty Damon is requesting a refill on the following medication(s):  Requested Prescriptions     Pending Prescriptions Disp Refills    atorvastatin (LIPITOR) 20 MG tablet 90 tablet 3     Sig: Take 1 tablet by mouth daily    metoprolol succinate (TOPROL XL) 25 MG extended release tablet 90 tablet 1     Sig: Take 0.5 tablets by mouth daily       Last Visit Date (If Applicable):  9/0/3039    Next Visit Date:    Visit date not found

## 2021-11-29 DIAGNOSIS — I48.91 ATRIAL FIBRILLATION, UNSPECIFIED TYPE (HCC): Primary | ICD-10-CM

## 2021-11-29 NOTE — TELEPHONE ENCOUNTER
Benitez Salazar is calling to request a refill on the following medication(s):  Requested Prescriptions     Pending Prescriptions Disp Refills    apixaban (ELIQUIS) 2.5 MG TABS tablet 180 tablet 0     Sig: Take 1 tablet by mouth 2 times daily Take by mouth 2 times daily       Last Visit Date (If Applicable):  5/0/3883    Next Visit Date:    Visit date not found

## 2021-12-01 ENCOUNTER — NURSE TRIAGE (OUTPATIENT)
Dept: OTHER | Facility: CLINIC | Age: 86
End: 2021-12-01

## 2021-12-01 NOTE — TELEPHONE ENCOUNTER
Received call from [place] [facility] [employer]. Brief description of triage: hypotension    Triage indicates for patient to be seen in er now. Info given to Henry Foreman at assisted living facility. Care advice provided, patient verbalizes understanding; denies any other questions or concerns; instructed to call back for any new or worsening symptoms. Attention Provider: Thank you for allowing me to participate in the care of your patient. The patient was connected to triage in response to information provided to the ECC/PSC. Please do not respond through this encounter as the response is not directed to a shared pool. Reason for Disposition   Systolic BP < 80 and NOT dizzy, lightheaded or weak (feels normal)    Answer Assessment - Initial Assessment Questions  1. BLOOD PRESSURE: \"What is the blood pressure? \" \"Did you take at least two measurements 5 minutes apart? \"      77/46 per nurse at facility    2. ONSET: \"When did you take your blood pressure? \"     Today    3. HOW: \"How did you obtain the blood pressure? \" (e.g., visiting nurse, automatic home BP monitor)    Facility monitor    4. HISTORY: \"Do you have a history of low blood pressure? \" \"What is your blood pressure normally? \"   no, unsure nurse is not present      5. MEDICATIONS: Reid Garduno you taking any medications for blood pressure? \" If yes: \"Have they been changed recently? \"       Metoprolol, cut back    6. PULSE RATE: \"Do you know what your pulse rate is? \"      Unsure    7. OTHER SYMPTOMS: Serena Mixer you been sick recently? \" \"Have you had a recent injury? \"    Fatigue    8. PREGNANCY: \"Is there any chance you are pregnant? \" \"When was your last menstrual period? \"     Not addressed do to age    Protocols used: LOW BLOOD PRESSURE-ADULT-[place]
Respiratory

## 2021-12-01 NOTE — TELEPHONE ENCOUNTER
Received call from [facility] MO[facility], caller not on line. Complaint: Low BP    Market: [address]  Name: [facility] telephone number verified as [phone], verified by [address] [place] [address] as patient's callback at the facility where she resides    Unsuccessful attempt to re-connect with caller via phone, unable to leave message for callback            Reason for Disposition   No answer. First attempt to contact caller. Follow-up call scheduled within 15 minutes.     Protocols used: NO CONTACT OR DUPLICATE CONTACT CALL-ADULT-[place]

## 2021-12-12 NOTE — TELEPHONE ENCOUNTER
Called pt to inform her Dexa scan is scheduled 6/14/21 at [address] [facility].  Phone just rang SLP: Clinical swallow evaluation completed, full note to follow.    Recommendations: Minced/moist solids (IDDSI 5), mildly thick liquids (IDDSI 2) when fully alert/upright. Will likely require 1:1 assist given difficulty self-feeding. Slow pacing, alternate between solids/liquids, straws OK if using small single sips (coughing observed with rapid consecutive). Hold PO if any overt difficulty or decline in respiratory status.

## 2022-01-13 LAB
CALCIUM SERPL-MCNC: 9.4 MG/DL (ref 8.4–10.2)
VITAMIN D 25-HYDROXY: 39.9 NG/ML (ref 30–100)

## 2022-01-30 DIAGNOSIS — I48.91 ATRIAL FIBRILLATION, UNSPECIFIED TYPE (HCC): ICD-10-CM

## 2022-01-31 RX ORDER — APIXABAN 2.5 MG/1
TABLET, FILM COATED ORAL
Qty: 30 TABLET | Refills: 0 | Status: SHIPPED | OUTPATIENT
Start: 2022-01-31 | End: 2022-06-24 | Stop reason: SDUPTHER

## 2022-01-31 NOTE — TELEPHONE ENCOUNTER
Tawanda Dubon is requesting a refill on the following medication(s):  Requested Prescriptions     Pending Prescriptions Disp Refills    ELIQUIS 2.5 MG TABS tablet [Pharmacy Med Name: Eliquis 2.5 MG Oral Tablet] 180 tablet 3     Sig: TAKE 1 TABLET BY MOUTH  TWICE DAILY       Last Visit Date (If Applicable):  6/5/4495    Next Visit Date:    Visit date not found

## 2022-01-31 NOTE — TELEPHONE ENCOUNTER
Please call patient to schedule an appointment.   Should establish-- 30 day to local pharmacy until patient is established

## 2022-06-24 DIAGNOSIS — I48.91 ATRIAL FIBRILLATION, UNSPECIFIED TYPE (HCC): ICD-10-CM

## 2022-06-24 NOTE — TELEPHONE ENCOUNTER
Refill request     Medication pended if agreeable    She is agreeable to wait until Dr. Pancho Cannon is back in       Last Appt:  Visit date not found  Next Appt:   Visit date not found  Med verified in [address]

## 2022-07-29 LAB — CALCIUM SERPL-MCNC: 8.6 MG/DL (ref 8.4–10.2)

## 2022-08-30 LAB
ALBUMIN/GLOBULIN RATIO: 1.2 G/DL
ALBUMIN: 3.7 G/DL (ref 3.5–5)
ALP BLD-CCNC: 93 UNITS/L (ref 38–126)
ALT SERPL-CCNC: 16 UNITS/L (ref 4–35)
ANION GAP SERPL CALCULATED.3IONS-SCNC: 18.9 MMOL/L
ANION GAP SERPL CALCULATED.3IONS-SCNC: 18.9 MMOL/L
AST SERPL-CCNC: 23 UNITS/L (ref 14–36)
BASOPHILS %: 0.7 (ref 0–3)
BASOPHILS ABSOLUTE: 0.05 (ref 0–0.3)
BILIRUB SERPL-MCNC: 0.9 MG/DL (ref 0.2–1.3)
BUN BLDV-MCNC: 104 MG/DL (ref 7–17)
BUN BLDV-MCNC: 99 MG/DL (ref 7–17)
CALCIUM SERPL-MCNC: 6.8 MG/DL (ref 8.4–10.2)
CALCIUM SERPL-MCNC: 6.9 MG/DL (ref 8.4–10.2)
CHLORIDE BLD-SCNC: 101 MMOL/L (ref 98–120)
CHLORIDE BLD-SCNC: 105 MMOL/L (ref 98–120)
CO2: 13 MMOL/L (ref 22–31)
CO2: 16 MMOL/L (ref 22–31)
CREAT SERPL-MCNC: 2.4 MG/DL (ref 0.5–1)
CREAT SERPL-MCNC: 2.5 MG/DL (ref 0.5–1)
EOSINOPHILS %: 1.69 (ref 0–10)
EOSINOPHILS ABSOLUTE: 0.11 (ref 0–1.1)
GFR CALCULATED: 19
GFR CALCULATED: 19.9
GLOBULIN: 3.2 G/DL
GLUCOSE: 102 MG/DL (ref 65–105)
GLUCOSE: 134 MG/DL (ref 65–105)
HCT VFR BLD CALC: 48.5 % (ref 37–47)
HEMOGLOBIN: 15.9 (ref 12–16)
LYMPHOCYTE %: 21.35 (ref 20–51.1)
LYMPHOCYTES ABSOLUTE: 1.44 (ref 1–5.5)
MAGNESIUM: 2 MG/DL (ref 1.6–2.3)
MCH RBC QN AUTO: 30.8 PG (ref 28.5–32.5)
MCHC RBC AUTO-ENTMCNC: 32.8 G/DL (ref 32–37)
MCV RBC AUTO: 93.8 FL (ref 80–94)
MONOCYTES %: 12.87 (ref 1.7–9.3)
MONOCYTES ABSOLUTE: 0.87 (ref 0.1–1)
NEUTROPHILS %: 63.39 (ref 42.2–75.2)
NEUTROPHILS ABSOLUTE: 4.27 (ref 2–8.1)
PDW BLD-RTO: 13.3 % (ref 8.5–15.5)
PLATELET # BLD: 241.1 THOU/MM3 (ref 130–400)
POTASSIUM SERPL-SCNC: 2.9 MMOL/L (ref 3.6–5)
POTASSIUM SERPL-SCNC: 2.9 MMOL/L (ref 3.6–5)
RBC: 5.17 M/UL (ref 4.2–5.4)
SODIUM BLD-SCNC: 130 MMOL/L (ref 135–145)
SODIUM BLD-SCNC: 137 MMOL/L (ref 135–145)
TOTAL PROTEIN, SERUM: 6.8 G/DL (ref 6.3–8.2)
TSH SERPL DL<=0.05 MIU/L-ACNC: 0.8 MIU/ML (ref 0.49–4.67)
VITAMIN B-12: 330 PG/ML (ref 239–931)
WBC: 6.7 THOU/ML3 (ref 4.8–10.8)

## 2022-08-31 LAB
ANION GAP SERPL CALCULATED.3IONS-SCNC: 18.5 MMOL/L
BUN BLDV-MCNC: 91 MG/DL (ref 7–17)
CALCIUM SERPL-MCNC: 6.1 MG/DL (ref 8.4–10.2)
CHLORIDE BLD-SCNC: 110 MMOL/L (ref 98–120)
CO2: 9 MMOL/L (ref 22–31)
CREAT SERPL-MCNC: 2.1 MG/DL (ref 0.5–1)
CREATININE CLEARANCE: 12.67
GFR CALCULATED: 23.3
GLUCOSE: 113 MG/DL (ref 65–105)
POTASSIUM SERPL-SCNC: 3.5 MMOL/L (ref 3.6–5)
SODIUM BLD-SCNC: 134 MMOL/L (ref 135–145)

## 2022-09-07 ENCOUNTER — OUTSIDE SERVICES (OUTPATIENT)
Dept: INTERNAL MEDICINE | Age: 87
End: 2022-09-07
Payer: MEDICARE

## 2022-09-07 DIAGNOSIS — I10 ESSENTIAL HYPERTENSION: ICD-10-CM

## 2022-09-07 DIAGNOSIS — E78.2 MIXED HYPERLIPIDEMIA: ICD-10-CM

## 2022-09-07 DIAGNOSIS — E21.3 HYPERPARATHYROIDISM (HCC): ICD-10-CM

## 2022-09-07 DIAGNOSIS — I25.10 CORONARY ARTERY DISEASE INVOLVING NATIVE CORONARY ARTERY OF NATIVE HEART WITHOUT ANGINA PECTORIS: ICD-10-CM

## 2022-09-07 DIAGNOSIS — J44.9 CHRONIC OBSTRUCTIVE PULMONARY DISEASE, UNSPECIFIED COPD TYPE (HCC): ICD-10-CM

## 2022-09-07 DIAGNOSIS — N17.9 AKI (ACUTE KIDNEY INJURY) (HCC): Primary | ICD-10-CM

## 2022-09-07 DIAGNOSIS — N13.9 OBSTRUCTIVE UROPATHY: ICD-10-CM

## 2022-09-07 PROCEDURE — 99305 1ST NF CARE MODERATE MDM 35: CPT | Performed by: INTERNAL MEDICINE

## 2022-09-12 NOTE — PROGRESS NOTES
Date of Admission: 9/2/2022  Date of Encounter: 9/7/2022  Patient:  Bernabe Levy  YOB: 1926      Chief Complaint: Admission after treatment in the hospital for BLANKA, obstructive Uroparhy    History of Present Illness:  She had been living in Assisted living but was admitted to [facility] with BLANKA and obstructive uropathy, she seems to have improved at the hospital and was discharged with a garcias catheter. She says that she feels fine at this time. Denies fever, chills,     Past Medical History: Afib, CKD, hyperparathyroidism, HTN, CAD, COPD  Past Family History: Noncontributory  Social History: Nonsmoker, nondrinker at this time. Review of Systems:  Constitutional: Denies fever, chills. Cardiac: Denies chest pain, palpitations  Except as dictated above, all other systems reviewed and are negative     Physical Exam  Vitals: /71. P 82. Temp 97.2. R 16  Constitutional: No acute distress  Eyes: No lid lag or proptosis. PERRLA  HENT: External ears normal. No lesions on the lips  Respiratory: Decreased air entry bilaterally. No wheezing or crackles  Cardiovascular: Normal S1, S2. No LL edema  Gastrointestinal: No visible veins or masses. Good bowel sounds  Skin: No abnormal rashes. No digital cyanosis  Neurologic: Cranial nerves grossly intact. Sensation grossly intact  Psychiatric: [Alert. Normal Affect. Assessments   Diagnosis Orders   1. BLANKA (acute kidney injury) ([address]        2. Obstructive uropathy        3. Essential hypertension        4. Coronary artery disease involving native coronary artery of native heart without angina pectoris        5. Chronic obstructive pulmonary disease, unspecified COPD type ([address]        6. Hyperparathyroidism ([address]        7. Mixed hyperlipidemia            Plan  Will continue other medications unless otherwise noted. I will be notified with any change in her condition.         This note has been created using the [facility] health record, and dictated in part by [employer] dictation system. Despite the documenting physician's best efforts, there may be errors in spelling, grammar or syntax.

## 2022-09-26 NOTE — PATIENT INSTRUCTIONS
If you need to reach the Urologist or Urology Nurses for any health care questions or concerns please call [phone] and ask to speak with the [facility] Urology Nurse. The phone line is open from 8a-430p Monday thru Friday.

## 2022-09-28 ENCOUNTER — OFFICE VISIT (OUTPATIENT)
Dept: UROLOGY | Age: 87
End: 2022-09-28
Payer: MEDICARE

## 2022-09-28 VITALS — OXYGEN SATURATION: 98 % | HEART RATE: 61 BPM | HEIGHT: 63 IN | BODY MASS INDEX: 26.28 KG/M2

## 2022-09-28 DIAGNOSIS — R33.9 URINARY RETENTION: Primary | ICD-10-CM

## 2022-09-28 PROBLEM — N18.30 CHRONIC RENAL DISEASE, STAGE III (HCC): Status: ACTIVE | Noted: 2022-09-28

## 2022-09-28 PROCEDURE — G8417 CALC BMI ABV UP PARAM F/U: HCPCS | Performed by: UROLOGY

## 2022-09-28 PROCEDURE — 99203 OFFICE O/P NEW LOW 30 MIN: CPT | Performed by: UROLOGY

## 2022-09-28 PROCEDURE — G8427 DOCREV CUR MEDS BY ELIG CLIN: HCPCS | Performed by: UROLOGY

## 2022-09-28 PROCEDURE — 1123F ACP DISCUSS/DSCN MKR DOCD: CPT | Performed by: UROLOGY

## 2022-09-28 PROCEDURE — 1090F PRES/ABSN URINE INCON ASSESS: CPT | Performed by: UROLOGY

## 2022-09-28 PROCEDURE — 1036F TOBACCO NON-USER: CPT | Performed by: UROLOGY

## 2022-09-28 RX ORDER — DOXYCYCLINE HYCLATE 100 MG
100 TABLET ORAL 2 TIMES DAILY
Qty: 6 TABLET | Refills: 0 | Status: SHIPPED | OUTPATIENT
Start: 2022-09-28 | End: 2022-10-01

## 2022-09-28 NOTE — PROGRESS NOTES
MD MD Nathen [facility] Consultation / New Patient Visit    Patient:  Dean Charles  YOB: 1926  Date: 9/28/2022  Consult requested from [facility] MD TONE     HISTORY OF PRESENT ILLNESS:   The patient is a 80 y.o. female who presents today for follow-up for the following problem(s): urinary retention  Overall the problem(s) : are worsening. Associated Symptoms: No dysuria, gross hematuria. Pain Severity: Pain Score:   0 - No pain    Today visit:   9/28/22   Presents with history of urinary retention. She has catheter in place. She had a UTI, which was treated with antibiotics. She has history of Solitary kidney, s/p left nephrectomy. Summary of old records:   (Patient's old records, notes and chart reviewed and summarized above.)    Urinalysis today:  No results found for this visit on 09/28/22.     Last BUN and creatinine:  Lab Results   Component Value Date    BUN 40 (H) 09/02/2022     Lab Results   Component Value Date    CREATININE 1.4 (H) 09/02/2022       Imaging Reviewed during this Office Visit:   (results were independently reviewed by physician and radiology report verified)    PAST MEDICAL, FAMILY AND SOCIAL HISTORY:  Past Medical History:   Diagnosis Date    Acute renal failure ([address]     Adrenal mass ([address]     stable    Aseptic necrosis ([address]     right hip    CAD (coronary artery disease)     COPD (chronic obstructive pulmonary disease) ([address]     good bronchodilator response  and mild reduction in DLCO    Left ventricular dysfunction     Osteoporosis     Renal cell carcinoma ([address] 1996    Small bowel obstruction ([address] 2006    ischemic bowel     Past Surgical History:   Procedure Laterality Date    AV FISTULA REPAIR  2007    enterocutaneous    CHOLECYSTECTOMY  2007    CORONARY ANGIOPLASTY  02/2015    [facility] normal    CORONARY ANGIOPLASTY WITH STENT PLACEMENT  1998    RCA twice in [address] (once for IWMI; repeat for re stenosis) CYSTOURETHROSCOPY/URETHRAL DILATION  [id number]    FEMUR FRACTURE SURGERY Right 2002    HERNIA REPAIR  2007    ventral hernia-Dr [address]    HERNIA REPAIR  05/2009    complex ventral Dr Abelino Melendez Right 2006    SMALL INTESTINE SURGERY  2006    ischemic bowel    TOTAL NEPHRECTOMY  1996    UPPER GASTROINTESTINAL ENDOSCOPY  06/2007    Dr Sinclair Spurling     Family History   Problem Relation Age of Onset    Breast Cancer Mother     Diabetes Mother     Coronary Art Dis Father      Outpatient Medications Marked as Taking for the 9/28/22 encounter (Office Visit) with Marylou Pulido MD   Medication Sig Dispense Refill    apixaban (ELIQUIS) 2.5 MG TABS tablet TAKE 1 TABLET BY MOUTH  TWICE DAILY 30 tablet 0    atorvastatin (LIPITOR) 20 MG tablet Take 1 tablet by mouth daily 90 tablet 3    metoprolol succinate (TOPROL XL) 25 MG extended release tablet Take 0.5 tablets by mouth daily 90 tablet 1    denosumab (PROLIA) 60 MG/ML SOLN SC injection Infuse 1 mL intravenously every 6 months 1.8 mL 0    Cholecalciferol (VITAMIN D) 2000 units CAPS capsule Take 1 capsule by mouth daily      aspirin 81 MG tablet Take 1 tablet by mouth daily      nitroGLYCERIN (NITROSTAT) 0.4 MG SL tablet          Alendronate, Codeine, Fentanyl, Hydrocodone-acetaminophen, Iodides, Lisinopril, and Blue dyes (parenteral)  Social History     Tobacco Use   Smoking Status Former   Smokeless Tobacco Never       Social History     Substance and Sexual Activity   Alcohol Use No       REVIEW OF SYSTEMS:  Constitutional: negative  Eyes: negative  Respiratory: negative  Cardiovascular: negative  Gastrointestinal: negative  Genitourinary: negative  Musculoskeletal: negative  Skin: negative   Neurological: negative  Hematological/Lymphatic: negative  Psychological: negative    Physical Exam:    This a 80 y.o. female      Vitals:    09/28/22 1147   Pulse: 61   SpO2: 98%     Constitutional: Patient in no acute distress   Neuro: alert and oriented to

## 2022-11-17 NOTE — TELEPHONE ENCOUNTER
Nursing home patient   Pharmacy requesting a refill of the below medication which has been pended for you:     Requested Prescriptions     Pending Prescriptions Disp Refills    atorvastatin (LIPITOR) 20 MG tablet 90 tablet 3     Sig: Take 1 tablet by mouth daily       Last Appointment Date: Visit date not found  Next Appointment Date: Visit date not found    Allergies   Allergen Reactions    Alendronate      Back pain    Codeine      Other reaction(s): Intolerance-unknown  Other reaction(s): Intolerance-unknown  Other reaction(s): Intolerance-unknown    Fentanyl      Other reaction(s): Intolerance-unknown  Other reaction(s): Intolerance-unknown  Other reaction(s): Intolerance-unknown    Hydrocodone-Acetaminophen      Other reaction(s): Intolerance-unknown  Other reaction(s): Intolerance-unknown  Other reaction(s): Intolerance-unknown    Iodides      Other reaction(s): Intolerance-unknown  Other reaction(s): Intolerance-unknown  Other reaction(s): Intolerance-unknown    Lisinopril      Other reaction(s): Intolerance-unknown  Other reaction(s): Intolerance-unknown  Other reaction(s): Intolerance-unknown  Other reaction(s): Intolerance-unknown  Other reaction(s): Intolerance-unknown  Other reaction(s):  Intolerance-unknown    Blue Dyes (Parenteral) Rash

## 2022-11-18 RX ORDER — ATORVASTATIN CALCIUM 20 MG/1
20 TABLET, FILM COATED ORAL DAILY
Qty: 90 TABLET | Refills: 0 | Status: SHIPPED | OUTPATIENT
Start: 2022-11-18 | End: 2022-11-22 | Stop reason: SDUPTHER

## 2022-11-22 RX ORDER — ATORVASTATIN CALCIUM 20 MG/1
20 TABLET, FILM COATED ORAL DAILY
Qty: 90 TABLET | Refills: 3 | Status: SHIPPED | OUTPATIENT
Start: 2022-11-22

## 2023-02-06 LAB — CALCIUM SERPL-MCNC: 9.2 MG/DL (ref 8.4–10.2)
